# Patient Record
Sex: FEMALE | Race: WHITE | NOT HISPANIC OR LATINO
[De-identification: names, ages, dates, MRNs, and addresses within clinical notes are randomized per-mention and may not be internally consistent; named-entity substitution may affect disease eponyms.]

---

## 2018-03-14 ENCOUNTER — APPOINTMENT (OUTPATIENT)
Dept: ELECTROPHYSIOLOGY | Facility: CLINIC | Age: 83
End: 2018-03-14
Payer: MEDICARE

## 2018-03-14 ENCOUNTER — APPOINTMENT (OUTPATIENT)
Dept: MRI IMAGING | Facility: HOSPITAL | Age: 83
End: 2018-03-14
Payer: MEDICARE

## 2018-03-14 ENCOUNTER — OUTPATIENT (OUTPATIENT)
Dept: OUTPATIENT SERVICES | Facility: HOSPITAL | Age: 83
LOS: 1 days | End: 2018-03-14
Payer: MEDICARE

## 2018-03-14 DIAGNOSIS — R51 HEADACHE: ICD-10-CM

## 2018-03-14 DIAGNOSIS — G45.9 TRANSIENT CEREBRAL ISCHEMIC ATTACK, UNSPECIFIED: ICD-10-CM

## 2018-03-14 PROCEDURE — 70546 MR ANGIOGRAPH HEAD W/O&W/DYE: CPT

## 2018-03-14 PROCEDURE — 71046 X-RAY EXAM CHEST 2 VIEWS: CPT

## 2018-03-14 PROCEDURE — 70544 MR ANGIOGRAPHY HEAD W/O DYE: CPT

## 2018-03-14 PROCEDURE — 93280 PM DEVICE PROGR EVAL DUAL: CPT

## 2018-03-14 PROCEDURE — 70553 MRI BRAIN STEM W/O & W/DYE: CPT

## 2018-03-14 PROCEDURE — 70544 MR ANGIOGRAPHY HEAD W/O DYE: CPT | Mod: 26,59

## 2018-03-14 PROCEDURE — 70553 MRI BRAIN STEM W/O & W/DYE: CPT | Mod: 26

## 2018-03-14 PROCEDURE — ZZZZZ: CPT

## 2018-03-14 PROCEDURE — A9585: CPT

## 2018-03-14 PROCEDURE — 71046 X-RAY EXAM CHEST 2 VIEWS: CPT | Mod: 26

## 2018-04-18 ENCOUNTER — APPOINTMENT (OUTPATIENT)
Dept: MRI IMAGING | Facility: HOSPITAL | Age: 83
End: 2018-04-18

## 2018-04-18 ENCOUNTER — OUTPATIENT (OUTPATIENT)
Dept: OUTPATIENT SERVICES | Facility: HOSPITAL | Age: 83
LOS: 1 days | End: 2018-04-18
Payer: MEDICARE

## 2018-04-18 ENCOUNTER — APPOINTMENT (OUTPATIENT)
Dept: ELECTROPHYSIOLOGY | Facility: CLINIC | Age: 83
End: 2018-04-18
Payer: MEDICARE

## 2018-04-18 VITALS
HEART RATE: 87 BPM | DIASTOLIC BLOOD PRESSURE: 68 MMHG | BODY MASS INDEX: 24.99 KG/M2 | HEIGHT: 65 IN | OXYGEN SATURATION: 96 % | SYSTOLIC BLOOD PRESSURE: 105 MMHG | WEIGHT: 150 LBS

## 2018-04-18 DIAGNOSIS — G96.0 CEREBROSPINAL FLUID LEAK: ICD-10-CM

## 2018-04-18 PROCEDURE — 72141 MRI NECK SPINE W/O DYE: CPT | Mod: 26

## 2018-04-18 PROCEDURE — 71046 X-RAY EXAM CHEST 2 VIEWS: CPT

## 2018-04-18 PROCEDURE — 93280 PM DEVICE PROGR EVAL DUAL: CPT

## 2018-04-18 PROCEDURE — 72148 MRI LUMBAR SPINE W/O DYE: CPT

## 2018-04-18 PROCEDURE — 72141 MRI NECK SPINE W/O DYE: CPT

## 2018-04-18 PROCEDURE — 71046 X-RAY EXAM CHEST 2 VIEWS: CPT | Mod: 26

## 2018-04-18 PROCEDURE — 72148 MRI LUMBAR SPINE W/O DYE: CPT | Mod: 26

## 2018-04-18 PROCEDURE — 72146 MRI CHEST SPINE W/O DYE: CPT

## 2018-04-18 PROCEDURE — 93280 PM DEVICE PROGR EVAL DUAL: CPT | Mod: 77

## 2018-04-18 PROCEDURE — 72146 MRI CHEST SPINE W/O DYE: CPT | Mod: 26

## 2018-04-24 ENCOUNTER — TRANSCRIPTION ENCOUNTER (OUTPATIENT)
Age: 83
End: 2018-04-24

## 2018-04-25 ENCOUNTER — APPOINTMENT (OUTPATIENT)
Dept: ELECTROPHYSIOLOGY | Facility: CLINIC | Age: 83
End: 2018-04-25

## 2018-04-25 ENCOUNTER — APPOINTMENT (OUTPATIENT)
Dept: MRI IMAGING | Facility: HOSPITAL | Age: 83
End: 2018-04-25

## 2018-06-15 ENCOUNTER — TRANSCRIPTION ENCOUNTER (OUTPATIENT)
Age: 83
End: 2018-06-15

## 2018-07-27 ENCOUNTER — TRANSCRIPTION ENCOUNTER (OUTPATIENT)
Age: 83
End: 2018-07-27

## 2018-08-07 ENCOUNTER — TRANSCRIPTION ENCOUNTER (OUTPATIENT)
Age: 83
End: 2018-08-07

## 2018-08-07 NOTE — ASU PATIENT PROFILE, ADULT - PMH
Atrial fibrillation    Depression    Hearing impairment    HLD (hyperlipidemia)    Hypothyroid    Intracranial hypotension    Osteoporosis    Takotsubo cardiomyopathy

## 2018-08-08 ENCOUNTER — OUTPATIENT (OUTPATIENT)
Dept: OUTPATIENT SERVICES | Facility: HOSPITAL | Age: 83
LOS: 1 days | End: 2018-08-08
Payer: MEDICARE

## 2018-08-08 VITALS
HEIGHT: 66 IN | HEART RATE: 61 BPM | WEIGHT: 150.36 LBS | TEMPERATURE: 99 F | RESPIRATION RATE: 14 BRPM | SYSTOLIC BLOOD PRESSURE: 126 MMHG | OXYGEN SATURATION: 96 % | DIASTOLIC BLOOD PRESSURE: 69 MMHG

## 2018-08-08 VITALS
DIASTOLIC BLOOD PRESSURE: 72 MMHG | HEART RATE: 62 BPM | OXYGEN SATURATION: 98 % | RESPIRATION RATE: 18 BRPM | SYSTOLIC BLOOD PRESSURE: 113 MMHG

## 2018-08-08 DIAGNOSIS — Z90.89 ACQUIRED ABSENCE OF OTHER ORGANS: Chronic | ICD-10-CM

## 2018-08-08 DIAGNOSIS — Z41.9 ENCOUNTER FOR PROCEDURE FOR PURPOSES OTHER THAN REMEDYING HEALTH STATE, UNSPECIFIED: Chronic | ICD-10-CM

## 2018-08-08 DIAGNOSIS — Z95.0 PRESENCE OF CARDIAC PACEMAKER: Chronic | ICD-10-CM

## 2018-08-08 DIAGNOSIS — H25.12 AGE-RELATED NUCLEAR CATARACT, LEFT EYE: ICD-10-CM

## 2018-08-08 DIAGNOSIS — Z90.49 ACQUIRED ABSENCE OF OTHER SPECIFIED PARTS OF DIGESTIVE TRACT: Chronic | ICD-10-CM

## 2018-08-08 PROCEDURE — V2787: CPT

## 2018-08-08 PROCEDURE — 66984 XCAPSL CTRC RMVL W/O ECP: CPT | Mod: LT

## 2018-08-08 NOTE — ASU DISCHARGE PLAN (ADULT/PEDIATRIC). - PT EDUC
Implant card (specify)/other (specify)/Intraocular lens implant (IOL), Eye shield with instructions , sunglasses and eye kit given to patient.

## 2018-08-11 PROBLEM — I48.91 UNSPECIFIED ATRIAL FIBRILLATION: Chronic | Status: ACTIVE | Noted: 2018-08-08

## 2018-08-11 PROBLEM — E03.9 HYPOTHYROIDISM, UNSPECIFIED: Chronic | Status: ACTIVE | Noted: 2018-08-08

## 2018-08-11 PROBLEM — G93.89 OTHER SPECIFIED DISORDERS OF BRAIN: Chronic | Status: ACTIVE | Noted: 2018-08-08

## 2018-08-11 PROBLEM — H91.90 UNSPECIFIED HEARING LOSS, UNSPECIFIED EAR: Chronic | Status: ACTIVE | Noted: 2018-08-08

## 2018-08-11 PROBLEM — F32.9 MAJOR DEPRESSIVE DISORDER, SINGLE EPISODE, UNSPECIFIED: Chronic | Status: ACTIVE | Noted: 2018-08-08

## 2018-08-11 PROBLEM — M81.0 AGE-RELATED OSTEOPOROSIS WITHOUT CURRENT PATHOLOGICAL FRACTURE: Chronic | Status: ACTIVE | Noted: 2018-08-08

## 2018-08-11 PROBLEM — I51.81 TAKOTSUBO SYNDROME: Chronic | Status: ACTIVE | Noted: 2018-08-08

## 2018-08-11 PROBLEM — E78.5 HYPERLIPIDEMIA, UNSPECIFIED: Chronic | Status: ACTIVE | Noted: 2018-08-08

## 2018-08-21 ENCOUNTER — TRANSCRIPTION ENCOUNTER (OUTPATIENT)
Age: 83
End: 2018-08-21

## 2018-08-22 ENCOUNTER — OUTPATIENT (OUTPATIENT)
Dept: OUTPATIENT SERVICES | Facility: HOSPITAL | Age: 83
LOS: 1 days | End: 2018-08-22
Payer: MEDICARE

## 2018-08-22 VITALS
SYSTOLIC BLOOD PRESSURE: 131 MMHG | HEART RATE: 62 BPM | OXYGEN SATURATION: 100 % | WEIGHT: 146.83 LBS | HEIGHT: 65 IN | DIASTOLIC BLOOD PRESSURE: 71 MMHG | TEMPERATURE: 98 F | RESPIRATION RATE: 15 BRPM

## 2018-08-22 VITALS
RESPIRATION RATE: 19 BRPM | DIASTOLIC BLOOD PRESSURE: 72 MMHG | OXYGEN SATURATION: 99 % | SYSTOLIC BLOOD PRESSURE: 148 MMHG | HEART RATE: 60 BPM

## 2018-08-22 DIAGNOSIS — Z90.49 ACQUIRED ABSENCE OF OTHER SPECIFIED PARTS OF DIGESTIVE TRACT: Chronic | ICD-10-CM

## 2018-08-22 DIAGNOSIS — Z95.0 PRESENCE OF CARDIAC PACEMAKER: Chronic | ICD-10-CM

## 2018-08-22 DIAGNOSIS — Z41.9 ENCOUNTER FOR PROCEDURE FOR PURPOSES OTHER THAN REMEDYING HEALTH STATE, UNSPECIFIED: Chronic | ICD-10-CM

## 2018-08-22 DIAGNOSIS — H25.11 AGE-RELATED NUCLEAR CATARACT, RIGHT EYE: ICD-10-CM

## 2018-08-22 DIAGNOSIS — Z90.89 ACQUIRED ABSENCE OF OTHER ORGANS: Chronic | ICD-10-CM

## 2018-08-22 PROCEDURE — 66984 XCAPSL CTRC RMVL W/O ECP: CPT | Mod: RT

## 2018-08-22 PROCEDURE — V2787: CPT

## 2018-08-29 ENCOUNTER — APPOINTMENT (OUTPATIENT)
Dept: ELECTROPHYSIOLOGY | Facility: CLINIC | Age: 83
End: 2018-08-29
Payer: MEDICARE

## 2018-08-29 ENCOUNTER — APPOINTMENT (OUTPATIENT)
Dept: MRI IMAGING | Facility: HOSPITAL | Age: 83
End: 2018-08-29

## 2018-08-29 ENCOUNTER — OUTPATIENT (OUTPATIENT)
Dept: OUTPATIENT SERVICES | Facility: HOSPITAL | Age: 83
LOS: 1 days | End: 2018-08-29
Payer: MEDICARE

## 2018-08-29 DIAGNOSIS — Z90.89 ACQUIRED ABSENCE OF OTHER ORGANS: Chronic | ICD-10-CM

## 2018-08-29 DIAGNOSIS — G93.9 DISORDER OF BRAIN, UNSPECIFIED: ICD-10-CM

## 2018-08-29 DIAGNOSIS — Z90.49 ACQUIRED ABSENCE OF OTHER SPECIFIED PARTS OF DIGESTIVE TRACT: Chronic | ICD-10-CM

## 2018-08-29 DIAGNOSIS — G96.0 CEREBROSPINAL FLUID LEAK: ICD-10-CM

## 2018-08-29 DIAGNOSIS — Z95.0 PRESENCE OF CARDIAC PACEMAKER: Chronic | ICD-10-CM

## 2018-08-29 DIAGNOSIS — Z41.9 ENCOUNTER FOR PROCEDURE FOR PURPOSES OTHER THAN REMEDYING HEALTH STATE, UNSPECIFIED: Chronic | ICD-10-CM

## 2018-08-29 PROCEDURE — 71046 X-RAY EXAM CHEST 2 VIEWS: CPT | Mod: 26

## 2018-08-29 PROCEDURE — 70553 MRI BRAIN STEM W/O & W/DYE: CPT | Mod: 26

## 2018-08-29 PROCEDURE — 93280 PM DEVICE PROGR EVAL DUAL: CPT

## 2018-08-29 PROCEDURE — 93280 PM DEVICE PROGR EVAL DUAL: CPT | Mod: 76

## 2018-08-29 PROCEDURE — 71046 X-RAY EXAM CHEST 2 VIEWS: CPT

## 2018-08-29 PROCEDURE — 70553 MRI BRAIN STEM W/O & W/DYE: CPT

## 2018-09-18 ENCOUNTER — OUTPATIENT (OUTPATIENT)
Dept: OUTPATIENT SERVICES | Facility: HOSPITAL | Age: 83
LOS: 1 days | End: 2018-09-18
Payer: MEDICARE

## 2018-09-18 ENCOUNTER — APPOINTMENT (OUTPATIENT)
Dept: ULTRASOUND IMAGING | Facility: HOSPITAL | Age: 83
End: 2018-09-18
Payer: MEDICARE

## 2018-09-18 DIAGNOSIS — Z90.49 ACQUIRED ABSENCE OF OTHER SPECIFIED PARTS OF DIGESTIVE TRACT: Chronic | ICD-10-CM

## 2018-09-18 DIAGNOSIS — Z41.9 ENCOUNTER FOR PROCEDURE FOR PURPOSES OTHER THAN REMEDYING HEALTH STATE, UNSPECIFIED: Chronic | ICD-10-CM

## 2018-09-18 DIAGNOSIS — Z90.89 ACQUIRED ABSENCE OF OTHER ORGANS: Chronic | ICD-10-CM

## 2018-09-18 DIAGNOSIS — N39.0 URINARY TRACT INFECTION, SITE NOT SPECIFIED: ICD-10-CM

## 2018-09-18 DIAGNOSIS — Z95.0 PRESENCE OF CARDIAC PACEMAKER: Chronic | ICD-10-CM

## 2018-09-18 PROCEDURE — 76770 US EXAM ABDO BACK WALL COMP: CPT

## 2018-09-18 PROCEDURE — 76770 US EXAM ABDO BACK WALL COMP: CPT | Mod: 26

## 2018-12-20 ENCOUNTER — TRANSCRIPTION ENCOUNTER (OUTPATIENT)
Age: 83
End: 2018-12-20

## 2019-01-11 ENCOUNTER — TRANSCRIPTION ENCOUNTER (OUTPATIENT)
Age: 84
End: 2019-01-11

## 2019-02-14 ENCOUNTER — TRANSCRIPTION ENCOUNTER (OUTPATIENT)
Age: 84
End: 2019-02-14

## 2019-06-01 ENCOUNTER — TRANSCRIPTION ENCOUNTER (OUTPATIENT)
Age: 84
End: 2019-06-01

## 2019-06-23 ENCOUNTER — TRANSCRIPTION ENCOUNTER (OUTPATIENT)
Age: 84
End: 2019-06-23

## 2019-08-15 ENCOUNTER — TRANSCRIPTION ENCOUNTER (OUTPATIENT)
Age: 84
End: 2019-08-15

## 2019-08-28 ENCOUNTER — TRANSCRIPTION ENCOUNTER (OUTPATIENT)
Age: 84
End: 2019-08-28

## 2019-09-07 ENCOUNTER — INPATIENT (INPATIENT)
Facility: HOSPITAL | Age: 84
LOS: 5 days | Discharge: NURSING, REHAB OR LONG-TERM CARE | DRG: 552 | End: 2019-09-13
Attending: SURGERY | Admitting: SURGERY
Payer: MEDICARE

## 2019-09-07 VITALS
HEART RATE: 96 BPM | RESPIRATION RATE: 16 BRPM | WEIGHT: 154.98 LBS | TEMPERATURE: 98 F | HEIGHT: 65 IN | DIASTOLIC BLOOD PRESSURE: 62 MMHG | SYSTOLIC BLOOD PRESSURE: 132 MMHG | OXYGEN SATURATION: 98 %

## 2019-09-07 DIAGNOSIS — Z90.49 ACQUIRED ABSENCE OF OTHER SPECIFIED PARTS OF DIGESTIVE TRACT: Chronic | ICD-10-CM

## 2019-09-07 DIAGNOSIS — Z41.9 ENCOUNTER FOR PROCEDURE FOR PURPOSES OTHER THAN REMEDYING HEALTH STATE, UNSPECIFIED: Chronic | ICD-10-CM

## 2019-09-07 DIAGNOSIS — Z90.89 ACQUIRED ABSENCE OF OTHER ORGANS: Chronic | ICD-10-CM

## 2019-09-07 DIAGNOSIS — S32.9XXA FRACTURE OF UNSPECIFIED PARTS OF LUMBOSACRAL SPINE AND PELVIS, INITIAL ENCOUNTER FOR CLOSED FRACTURE: ICD-10-CM

## 2019-09-07 DIAGNOSIS — Z95.0 PRESENCE OF CARDIAC PACEMAKER: Chronic | ICD-10-CM

## 2019-09-07 LAB
ALBUMIN SERPL ELPH-MCNC: 4.3 G/DL — SIGNIFICANT CHANGE UP (ref 3.3–5)
ALP SERPL-CCNC: 61 U/L — SIGNIFICANT CHANGE UP (ref 40–120)
ALT FLD-CCNC: 33 U/L — SIGNIFICANT CHANGE UP (ref 10–45)
ANION GAP SERPL CALC-SCNC: 14 MMOL/L — SIGNIFICANT CHANGE UP (ref 5–17)
APTT BLD: 29.6 SEC — SIGNIFICANT CHANGE UP (ref 27.5–36.3)
AST SERPL-CCNC: 28 U/L — SIGNIFICANT CHANGE UP (ref 10–40)
BASOPHILS # BLD AUTO: 0 K/UL — SIGNIFICANT CHANGE UP (ref 0–0.2)
BASOPHILS NFR BLD AUTO: 0.2 % — SIGNIFICANT CHANGE UP (ref 0–2)
BILIRUB SERPL-MCNC: 0.3 MG/DL — SIGNIFICANT CHANGE UP (ref 0.2–1.2)
BUN SERPL-MCNC: 17 MG/DL — SIGNIFICANT CHANGE UP (ref 7–23)
CALCIUM SERPL-MCNC: 9.4 MG/DL — SIGNIFICANT CHANGE UP (ref 8.4–10.5)
CHLORIDE SERPL-SCNC: 100 MMOL/L — SIGNIFICANT CHANGE UP (ref 96–108)
CO2 SERPL-SCNC: 26 MMOL/L — SIGNIFICANT CHANGE UP (ref 22–31)
CREAT SERPL-MCNC: 1.34 MG/DL — HIGH (ref 0.5–1.3)
EOSINOPHIL # BLD AUTO: 0.2 K/UL — SIGNIFICANT CHANGE UP (ref 0–0.5)
EOSINOPHIL NFR BLD AUTO: 3.2 % — SIGNIFICANT CHANGE UP (ref 0–6)
GLUCOSE SERPL-MCNC: 125 MG/DL — HIGH (ref 70–99)
HCT VFR BLD CALC: 42.1 % — SIGNIFICANT CHANGE UP (ref 34.5–45)
HGB BLD-MCNC: 13.1 G/DL — SIGNIFICANT CHANGE UP (ref 11.5–15.5)
INR BLD: 3.33 RATIO — HIGH (ref 0.88–1.16)
LYMPHOCYTES # BLD AUTO: 0.4 K/UL — LOW (ref 1–3.3)
LYMPHOCYTES # BLD AUTO: 6.1 % — LOW (ref 13–44)
MCHC RBC-ENTMCNC: 27.8 PG — SIGNIFICANT CHANGE UP (ref 27–34)
MCHC RBC-ENTMCNC: 31.2 GM/DL — LOW (ref 32–36)
MCV RBC AUTO: 89.2 FL — SIGNIFICANT CHANGE UP (ref 80–100)
MONOCYTES # BLD AUTO: 0.5 K/UL — SIGNIFICANT CHANGE UP (ref 0–0.9)
MONOCYTES NFR BLD AUTO: 7.1 % — SIGNIFICANT CHANGE UP (ref 2–14)
NEUTROPHILS # BLD AUTO: 5.9 K/UL — SIGNIFICANT CHANGE UP (ref 1.8–7.4)
NEUTROPHILS NFR BLD AUTO: 83.3 % — HIGH (ref 43–77)
PLATELET # BLD AUTO: 202 K/UL — SIGNIFICANT CHANGE UP (ref 150–400)
POTASSIUM SERPL-MCNC: 3.6 MMOL/L — SIGNIFICANT CHANGE UP (ref 3.5–5.3)
POTASSIUM SERPL-SCNC: 3.6 MMOL/L — SIGNIFICANT CHANGE UP (ref 3.5–5.3)
PROT SERPL-MCNC: 7.2 G/DL — SIGNIFICANT CHANGE UP (ref 6–8.3)
PROTHROM AB SERPL-ACNC: 39.8 SEC — HIGH (ref 10–12.9)
RBC # BLD: 4.72 M/UL — SIGNIFICANT CHANGE UP (ref 3.8–5.2)
RBC # FLD: 14 % — SIGNIFICANT CHANGE UP (ref 10.3–14.5)
SODIUM SERPL-SCNC: 140 MMOL/L — SIGNIFICANT CHANGE UP (ref 135–145)
WBC # BLD: 7.1 K/UL — SIGNIFICANT CHANGE UP (ref 3.8–10.5)
WBC # FLD AUTO: 7.1 K/UL — SIGNIFICANT CHANGE UP (ref 3.8–10.5)

## 2019-09-07 PROCEDURE — 99221 1ST HOSP IP/OBS SF/LOW 40: CPT

## 2019-09-07 PROCEDURE — 93010 ELECTROCARDIOGRAM REPORT: CPT

## 2019-09-07 PROCEDURE — 72192 CT PELVIS W/O DYE: CPT | Mod: 26

## 2019-09-07 PROCEDURE — 99291 CRITICAL CARE FIRST HOUR: CPT

## 2019-09-07 PROCEDURE — 71275 CT ANGIOGRAPHY CHEST: CPT | Mod: 26

## 2019-09-07 PROCEDURE — 99285 EMERGENCY DEPT VISIT HI MDM: CPT | Mod: GC

## 2019-09-07 PROCEDURE — 70450 CT HEAD/BRAIN W/O DYE: CPT | Mod: 26

## 2019-09-07 PROCEDURE — 73502 X-RAY EXAM HIP UNI 2-3 VIEWS: CPT | Mod: 26,RT

## 2019-09-07 PROCEDURE — 74174 CTA ABD&PLVS W/CONTRAST: CPT | Mod: 26

## 2019-09-07 RX ORDER — OXYCODONE HYDROCHLORIDE 5 MG/1
5 TABLET ORAL EVERY 6 HOURS
Refills: 0 | Status: DISCONTINUED | OUTPATIENT
Start: 2019-09-07 | End: 2019-09-10

## 2019-09-07 RX ORDER — DENOSUMAB 60 MG/ML
0 INJECTION SUBCUTANEOUS
Qty: 0 | Refills: 0 | DISCHARGE

## 2019-09-07 RX ORDER — LIDOCAINE 4 G/100G
1 CREAM TOPICAL EVERY 24 HOURS
Refills: 0 | Status: DISCONTINUED | OUTPATIENT
Start: 2019-09-07 | End: 2019-09-13

## 2019-09-07 RX ORDER — RANITIDINE HYDROCHLORIDE 150 MG/1
1 TABLET, FILM COATED ORAL
Qty: 0 | Refills: 0 | DISCHARGE

## 2019-09-07 RX ORDER — ASPIRIN/CALCIUM CARB/MAGNESIUM 324 MG
324 TABLET ORAL ONCE
Refills: 0 | Status: DISCONTINUED | OUTPATIENT
Start: 2019-09-07 | End: 2019-09-07

## 2019-09-07 RX ORDER — WARFARIN SODIUM 2.5 MG/1
1 TABLET ORAL
Qty: 0 | Refills: 0 | DISCHARGE

## 2019-09-07 RX ORDER — INFLUENZA VIRUS VACCINE 15; 15; 15; 15 UG/.5ML; UG/.5ML; UG/.5ML; UG/.5ML
0.5 SUSPENSION INTRAMUSCULAR ONCE
Refills: 0 | Status: COMPLETED | OUTPATIENT
Start: 2019-09-07 | End: 2019-09-07

## 2019-09-07 RX ORDER — METOPROLOL TARTRATE 50 MG
25 TABLET ORAL DAILY
Refills: 0 | Status: DISCONTINUED | OUTPATIENT
Start: 2019-09-07 | End: 2019-09-08

## 2019-09-07 RX ORDER — OLANZAPINE 15 MG/1
5 TABLET, FILM COATED ORAL
Refills: 0 | Status: DISCONTINUED | OUTPATIENT
Start: 2019-09-07 | End: 2019-09-13

## 2019-09-07 RX ORDER — ACETAMINOPHEN 500 MG
975 TABLET ORAL ONCE
Refills: 0 | Status: COMPLETED | OUTPATIENT
Start: 2019-09-07 | End: 2019-09-07

## 2019-09-07 RX ORDER — PANTOPRAZOLE SODIUM 20 MG/1
1 TABLET, DELAYED RELEASE ORAL
Qty: 0 | Refills: 0 | DISCHARGE

## 2019-09-07 RX ORDER — SERTRALINE 25 MG/1
200 TABLET, FILM COATED ORAL DAILY
Refills: 0 | Status: DISCONTINUED | OUTPATIENT
Start: 2019-09-07 | End: 2019-09-09

## 2019-09-07 RX ORDER — OLANZAPINE 15 MG/1
0 TABLET, FILM COATED ORAL
Qty: 0 | Refills: 0 | DISCHARGE

## 2019-09-07 RX ORDER — ACETAMINOPHEN 500 MG
650 TABLET ORAL EVERY 6 HOURS
Refills: 0 | Status: DISCONTINUED | OUTPATIENT
Start: 2019-09-07 | End: 2019-09-08

## 2019-09-07 RX ORDER — LEVOTHYROXINE SODIUM 125 MCG
75 TABLET ORAL DAILY
Refills: 0 | Status: DISCONTINUED | OUTPATIENT
Start: 2019-09-07 | End: 2019-09-13

## 2019-09-07 RX ORDER — SERTRALINE 25 MG/1
2 TABLET, FILM COATED ORAL
Qty: 0 | Refills: 0 | DISCHARGE

## 2019-09-07 RX ORDER — OXYCODONE HYDROCHLORIDE 5 MG/1
5 TABLET ORAL ONCE
Refills: 0 | Status: DISCONTINUED | OUTPATIENT
Start: 2019-09-07 | End: 2019-09-07

## 2019-09-07 RX ORDER — PANTOPRAZOLE SODIUM 20 MG/1
40 TABLET, DELAYED RELEASE ORAL
Refills: 0 | Status: DISCONTINUED | OUTPATIENT
Start: 2019-09-07 | End: 2019-09-13

## 2019-09-07 RX ORDER — SODIUM CHLORIDE 9 MG/ML
1000 INJECTION INTRAMUSCULAR; INTRAVENOUS; SUBCUTANEOUS ONCE
Refills: 0 | Status: COMPLETED | OUTPATIENT
Start: 2019-09-07 | End: 2019-09-07

## 2019-09-07 RX ORDER — LEVOTHYROXINE SODIUM 125 MCG
1 TABLET ORAL
Qty: 0 | Refills: 0 | DISCHARGE

## 2019-09-07 RX ADMIN — SODIUM CHLORIDE 2000 MILLILITER(S): 9 INJECTION INTRAMUSCULAR; INTRAVENOUS; SUBCUTANEOUS at 08:04

## 2019-09-07 RX ADMIN — OXYCODONE HYDROCHLORIDE 5 MILLIGRAM(S): 5 TABLET ORAL at 15:00

## 2019-09-07 RX ADMIN — Medication 650 MILLIGRAM(S): at 19:00

## 2019-09-07 RX ADMIN — OXYCODONE HYDROCHLORIDE 5 MILLIGRAM(S): 5 TABLET ORAL at 10:28

## 2019-09-07 RX ADMIN — Medication 975 MILLIGRAM(S): at 07:40

## 2019-09-07 RX ADMIN — PANTOPRAZOLE SODIUM 40 MILLIGRAM(S): 20 TABLET, DELAYED RELEASE ORAL at 18:38

## 2019-09-07 RX ADMIN — Medication 75 MICROGRAM(S): at 18:39

## 2019-09-07 RX ADMIN — Medication 650 MILLIGRAM(S): at 18:38

## 2019-09-07 RX ADMIN — LIDOCAINE 1 PATCH: 4 CREAM TOPICAL at 18:39

## 2019-09-07 RX ADMIN — Medication 25 MILLIGRAM(S): at 18:39

## 2019-09-07 NOTE — H&P ADULT - NSICDXPASTSURGICALHX_GEN_ALL_CORE_FT
PAST SURGICAL HISTORY:  Cardiac pacemaker     Elective surgery lymph node surgery    S/P appendectomy     S/P tonsillectomy

## 2019-09-07 NOTE — H&P ADULT - ASSESSMENT
ASSESSMENT: This is an 84 year old female, on coumadin for atrial fibrillation, s/p fall, with pelvic fracture, right sided posterior 12th rib fx    PLAN:      - Admit to trauma surgery (Dr. Ryan Cabrera)   - regular diet, restart home meds, hold anticoagulation, repeat cbc in pm today, repeat cbc in am   - appreciate orthopedic surgery input (WBAT, repeat Pelvic XR's in 1 week)  - physical therapy consult   - Patient seen/examined with attending.   - Plan discussed with Attending, Dr. Cabrera ASSESSMENT: This is an 84 year old female, on coumadin for atrial fibrillation, s/p fall, with pelvic fracture, right sided posterior 12th rib fx, pelvic hematoma with no active extravasation.     PLAN:      - Admit to trauma surgery (Dr. Ryan Cabrera)   - regular diet, restart home meds, hold anticoagulation, repeat cbc in pm today, repeat cbc in am   - appreciate orthopedic surgery input (WBAT, repeat Pelvic XR's in 1 week)  - physical therapy consult   - Patient seen/examined with attending.   - Plan discussed with Attending, Dr. Cabrera

## 2019-09-07 NOTE — H&P ADULT - NSICDXPASTMEDICALHX_GEN_ALL_CORE_FT
PAST MEDICAL HISTORY:  Atrial fibrillation     Depression     Hearing impairment     HLD (hyperlipidemia)     Hypothyroid     Intracranial hypotension     Osteoporosis     Takotsubo cardiomyopathy

## 2019-09-07 NOTE — ED ADULT NURSE NOTE - OBJECTIVE STATEMENT
83 y/o Female presenting to the ED by EMS from home, after getting up to go to the bathroom and having a syncopal episode on the way to the bathroom. Pt reports constipation recently so she took a laxative and has had multiple episodes of diarrhea. +LOC, pt hit her head, reports headache but denies blurry vision or dizziness at this time, pt on coumadin. Pupils 3mm PERRL. Normal ROM noted. Pt appears slightly pale at this time. Pt resting quietly on stretcher in no current distress. Pt denies chest pain, shortness of breath, fever, chills, weakness, N/V. Safety and comfort measures provided and maintained, bed in lowest position, side rails up for safety.

## 2019-09-07 NOTE — ED PROVIDER NOTE - ATTENDING CONTRIBUTION TO CARE
I have seen and evaluated this patient with the resident.   I agree with the findings  unless other wise stated.  I have made appropriate changes in documentations where needed, After my face to face bedside evaluation, I am further  notin yo F PMHx pacemaker, AFIB on Coumadin, hypothyroidism, being treated for UTI with Clindamycin, p/w fall. Pt fell this morning around 6 AM when she woke up to use the bathroom. She has been having multiple episodes of diarrhea since last night. She denies LOC. She states she felt dizzy and fell but recalls the whole event. She denies CP/palpitations, headaches, vision changes, abd pain, N/V. She has not been able to ambulate since the fall. She denies tobacco, ETOH, drug use. Alert mild distress 2/2/ pain clear lungs heart irregular Abdomen soft no tender tender in right groin no limb shortening right side no hip tenderness no distal neuro vascular deficit concern for pelvic fracture and bleeding as pt also on coumadin pain control CT pelvis --Porter I have seen and evaluated this patient with the resident.   I agree with the findings  unless other wise stated.  I have made appropriate changes in documentations where needed, After my face to face bedside evaluation, I am further  notin yo F PMHx pacemaker, AFIB on Coumadin, hypothyroidism, being treated for UTI with Clindamycin, p/w fall. Pt fell this morning around 6 AM when she woke up to use the bathroom. She has been having multiple episodes of diarrhea since last night. She denies LOC. She states she felt dizzy and fell but recalls the whole event. She denies CP/palpitations, headaches, vision changes, abd pain, N/V. She has not been able to ambulate since the fall. She denies tobacco, ETOH, drug use. Alert mild distress 2/2/ pain clear lungs heart irregular Abdomen soft no tender tender in right groin no limb shortening right side no hip tenderness no distal neuro vascular deficit concern for pelvic fracture and bleeding as pt also on coumadin pain control CT pelvis Trauma and Ortho eval -CTA chest abdomen and pelvis to determine bleeding TBA -Porter

## 2019-09-07 NOTE — ED PROVIDER NOTE - NS ED ROS FT
GENERAL: No fever or chills, EYES: no change in vision, HEENT: no trouble swallowing or speaking, CARDIAC: no chest pain, PULMONARY: no cough or SOB, GI: no abdominal pain, no nausea, no vomiting, +diarrhea, no constipation, : No changes in urination, SKIN: no rashes, NEURO: no headache,  MSK: No joint pain ~Suman Manjarrez M.D. Resident

## 2019-09-07 NOTE — H&P ADULT - NSHPLABSRESULTS_GEN_ALL_CORE
CT abd/pelvis: CT of the Chest, Abdomen and Pelvis was performed with intravenous   contrast.   Imaging was performed through the chest in the arterial phase followed by   imaging of the abdomen and pelvis in the portal venous phase.  Intravenous contrast: 90 ml Omnipaque 350. 10 ml discarded.  Oral contrast: None.  Sagittal and coronal reformats were performed.    FINDINGS:    CHEST:     LUNGS AND LARGE AIRWAYS: The airways are unremarkable. The lungs are   clear. Linear atelectasis within the left lower lobe.  PLEURA: No pleural effusion. No pneumothorax.  VESSELS: Atheromatous disease.  HEART: Cardiomegaly. No pericardial effusion. Status post left-sided   pacemaker placement.  MEDIASTINUM AND MACY: No lymphadenopathy. Small hiatal hernia.  CHEST WALL AND LOWER NECK: Left sided chest wall pacemaker placement.    ABDOMEN AND PELVIS:    LIVER: Within normal limits.  BILE DUCTS: Mild dilatation of the extrahepatic common bile duct up to   1.1 cm. No intrahepatic ductal dilatation seen.  GALLBLADDER: Cholelithiasis.  SPLEEN: Within normal limits.  PANCREAS: Prominent pancreatic duct up to 3 mm. This appears slightly   increased compared to prior CT from 11/11/2014.  ADRENALS: Within normal limits.  KIDNEYS/URETERS: On the right side, there is a 2 mm nonobstructing renal   calculus within the upper pole. Right hilar pelvic cysts. No   hydronephrosis. On the left side, there is a punctate nonobstructing   renal calculus within the left renal pelvis. No hydronephrosis.    BLADDER: Within normal limits.  REPRODUCTIVE ORGANS: The uterus is unremarkable. No adnexal masses.    BOWEL: No bowel obstruction. Appendix is not visualized.  PERITONEUM: No ascites.  VESSELS: Atheromatous disease  RETROPERITONEUM/LYMPH NODES: No lymphadenopathy.    ABDOMINAL WALL: Within normal limits.  BONES: Minimally displaced acute fracture of the posterior aspect of the   right 12th rib. Acute fracture of the right sacral ala as well as   obliquely oriented minimally displaced fractures of the right superior   and inferior pubic rami. Age indeterminate compression deformity of the   L1 vertebral body. Degenerative changes of the spine.    IMPRESSION:     Minimally displaced acute fracture of the posterior aspect of the right   12th rib. Acute fracture of the right sacral ala as well as obliquely   oriented minimally displaced fractures of the right superior and inferior   pubic rami. Age indeterminate compression deformity of the L1 vertebral   body.          CT head: FINDINGS:   There is no CT evidence of acute intracranial hemorrhage, extra-axial   collection, vasogenic edema, mass effect, midline shift, central   herniation, or hydrocephalus.     There is age-appropriate cerebral volume loss. There is periventricular   patchy white matter hypoattenuation which is nonspecific in etiology but   likely related to chronic microvascular ischemic disease.    The visualized paranasal sinuses are clear. The mastoid air cells and   middle ear cavities are clear.    The soft tissues of the scalp are unremarkable. The calvarium is intact.    IMPRESSION:   No CT evidence of acute intracranial hemorrhage, brain edema, mass effect   or acute territorial infarct.           CT pelvis:    IMPRESSION:  1.  Acute fractures of the right superior and inferior pubic rami.  2.  Acute minimally displaced fracture of the right sacral ala.  3.  Small hematomas are identified adjacent to the right sacral ala and   right superior pubic ramus fractures.

## 2019-09-07 NOTE — ED ADULT NURSE REASSESSMENT NOTE - NS ED NURSE REASSESS COMMENT FT1
Received report from Felicitas BALDWIN. Patient resting comfortably in stretcher. A&Ox4. Vital signs are stable. Patient currently reports 7/10 left hip pain. Patient pending X-ray and blood tests results. Plan of care discussed. Safety and comfort measures maintained.

## 2019-09-07 NOTE — CONSULT NOTE ADULT - SUBJECTIVE AND OBJECTIVE BOX
[facility] Trauma Surgery Consultation       HPI:  This is an 84 year old female, on coumadin for atrial fibrillation, lives in assisted living, ambulate with walker/cane presents after a ground level fall today at 6 am. She has been having diarrhea as she was started on antibiotics for her UTI. She did not lose consciousness. She states she felt dizzy and fell but recalls the whole event. She was trying to use the bathroom when she fell.She did not hit her head. She c/o R hip pain and inability to ambulate after the mechanical fall this morning.  She has Medtronic pacemaker. Denies HS/LOC. Denies numbness/tingling. Denies fever/chills. Denies pain/injury elsewhere.     PAST MEDICAL & SURGICAL HISTORY:  Hearing impairment  Intracranial hypotension  Depression  HLD (hyperlipidemia)  Takotsubo cardiomyopathy  Atrial fibrillation  Osteoporosis  Hypothyroid  Elective surgery: lymph node surgery  Cardiac pacemaker  S/P appendectomy  S/P tonsillectomy      FAMILY HISTORY:      SOCIAL HISTORY: not smoker, does not drink alcohol     MEDICATIONS:    Home Medications:  synthroid 75 mcg  prolia 60 mg into skin every 6 months   warfarin 7.5 mg daily + 1/2 twice weekly   metoprolol 25 mg as needed   protonix 40 mg   ranitidine 300 mg night   zoloft 100 mgx2 daily   aprazolam 0.25 mg x 2 daily   olanzapine 5 mg bedtime 3x week    MEDICATIONS  (PRN):    Allergies    bacitracin (Unknown)  penicillin (Unknown)    Intolerances        Vital Signs Last 24 Hrs  T(C): 36.8 (07 Sep 2019 06:53), Max: 36.8 (07 Sep 2019 06:53)  T(F): 98.3 (07 Sep 2019 06:53), Max: 98.3 (07 Sep 2019 06:53)  HR: 96 (07 Sep 2019 06:53) (96 - 96)  BP: 132/62 (07 Sep 2019 06:53) (132/62 - 132/62)  BP(mean): --  RR: 16 (07 Sep 2019 06:53) (16 - 16)  SpO2: 98% (07 Sep 2019 06:53) (98% - 98%)  Daily Height in cm: 165.1 (07 Sep 2019 06:53)    Daily                             13.1   7.1   )-----------( 202      ( 07 Sep 2019 07:42 )             42.1     09-07    140  |  100  |  17  ----------------------------<  125<H>  3.6   |  26  |  1.34<H>    Ca    9.4      07 Sep 2019 07:42    TPro  7.2  /  Alb  4.3  /  TBili  0.3  /  DBili  x   /  AST  28  /  ALT  33  /  AlkPhos  61  09-07    PT/INR - ( 07 Sep 2019 07:42 )   PT: 39.8 sec;   INR: 3.33 ratio         PTT - ( 07 Sep 2019 07:42 )  PTT:29.6 sec      Radiographic Findings:       Assessment: [facility] Trauma Surgery Consultation       HPI:  This is an 84 year old female, on coumadin for atrial fibrillation, lives in assisted living, ambulate with walker/cane presents after a ground level fall today at 6 am. She has been having diarrhea as she was started on antibiotics for her UTI. She did not lose consciousness. She states she felt dizzy and fell but recalls the whole event. She was trying to use the bathroom when she fell.She did not hit her head. She c/o R hip pain and inability to ambulate after the mechanical fall this morning.  She has Medtronic pacemaker. Denies HS/LOC. Denies numbness/tingling. Denies fever/chills. Denies pain/injury elsewhere.     ROS: 10-system review is otherwise negative except HPI above.      PAST MEDICAL & SURGICAL HISTORY:  Hearing impairment  Intracranial hypotension  Depression  HLD (hyperlipidemia)  Takotsubo cardiomyopathy  Atrial fibrillation  Osteoporosis  Hypothyroid  Elective surgery: lymph node surgery  Cardiac pacemaker  S/P appendectomy  S/P tonsillectomy      FAMILY HISTORY:      SOCIAL HISTORY: not smoker, does not drink alcohol     MEDICATIONS:    Home Medications:  synthroid 75 mcg  prolia 60 mg into skin every 6 months   warfarin 7.5 mg daily + 1/2 of the 7.5 tablet in addition to the 7.5 twice weekly (Monday and Thursday)   metoprolol 25 mg as needed   protonix 40 mg   ranitidine 300 mg night   zoloft 100 mgx2 daily   aprazolam 0.25 mg x 2 daily   olanzapine 5 mg bedtime 3x week    MEDICATIONS  (PRN):    Allergies    bacitracin (Unknown)  penicillin (Unknown)    Intolerances        Vital Signs Last 24 Hrs  T(C): 36.8 (07 Sep 2019 06:53), Max: 36.8 (07 Sep 2019 06:53)  T(F): 98.3 (07 Sep 2019 06:53), Max: 98.3 (07 Sep 2019 06:53)  HR: 96 (07 Sep 2019 06:53) (96 - 96)  BP: 132/62 (07 Sep 2019 06:53) (132/62 - 132/62)  BP(mean): --  RR: 16 (07 Sep 2019 06:53) (16 - 16)  SpO2: 98% (07 Sep 2019 06:53) (98% - 98%)  Daily Height in cm: 165.1 (07 Sep 2019 06:53)        Primary Survey:    A - airway intact  B - bilateral breath sounds and good chest rise  C - initial BP: 132/62 (09-07-19 @ 06:53)*** , HR: 96 (09-07-19 @ 06:53)*** , palpable pulses in all extremities  D - GCS 15 on arrival  Exposure obtained    Secondary Survey:   General: NAD  HEENT: Normocephalic, atraumatic, EOMI, PEERLA.  Neck: Soft, midline trachea  Chest: No chest wall tenderness, thorax stable, no ecchymosis.   Cardiac: irregularly irregular   Respiratory: Bilateral breath sounds, clear and equal bilaterally.   Abdomen: Soft, non-distended, non-tender, no rebound, no guarding, no ecchymosis.   Pelvis: Stable, non-tender.   Ext: palp radial b/l UE, b/l DP palp in Lower Extrem.   Back: not examined                               13.1   7.1   )-----------( 202      ( 07 Sep 2019 07:42 )             42.1     09-07    140  |  100  |  17  ----------------------------<  125<H>  3.6   |  26  |  1.34<H>    Ca    9.4      07 Sep 2019 07:42    TPro  7.2  /  Alb  4.3  /  TBili  0.3  /  DBili  x   /  AST  28  /  ALT  33  /  AlkPhos  61  09-07    PT/INR - ( 07 Sep 2019 07:42 )   PT: 39.8 sec;   INR: 3.33 ratio         PTT - ( 07 Sep 2019 07:42 )  PTT:29.6 sec      Radiographic Findings:

## 2019-09-07 NOTE — H&P ADULT - HISTORY OF PRESENT ILLNESS
HPI:  This is an 84 year old female, on coumadin for atrial fibrillation, lives in assisted living, ambulate with walker/cane presents after a ground level fall today at 6 am. She has been having diarrhea as she was started on antibiotics for her UTI. She did not lose consciousness. She states she felt dizzy and fell but recalls the whole event. She was trying to use the bathroom when she fell.She did not hit her head. She c/o R hip pain and inability to ambulate after the mechanical fall this morning.  She has Medtronic pacemaker. Denies HS/LOC. Denies numbness/tingling. Denies fever/chills. Denies pain/injury elsewhere.     ROS: 10-system review is otherwise negative except HPI above.      PAST MEDICAL & SURGICAL HISTORY:  Hearing impairment  Intracranial hypotension  Depression  HLD (hyperlipidemia)  Takotsubo cardiomyopathy  Atrial fibrillation  Osteoporosis  Hypothyroid  Elective surgery: lymph node surgery  Cardiac pacemaker  S/P appendectomy  S/P tonsillectomy      FAMILY HISTORY:      SOCIAL HISTORY: not smoker, does not drink alcohol     MEDICATIONS:    Home Medications:  synthroid 75 mcg  prolia 60 mg into skin every 6 months   warfarin 7.5 mg daily + 1/2 of the 7.5 tablet in addition to the 7.5 twice weekly (Monday and Thursday)   metoprolol 25 mg as needed   protonix 40 mg   ranitidine 300 mg night   zoloft 100 mgx2 daily   aprazolam 0.25 mg x 2 daily   olanzapine 5 mg bedtime 3x week    MEDICATIONS  (PRN):    Allergies    bacitracin (Unknown)  penicillin (Unknown)    Intolerances        Vital Signs Last 24 Hrs  T(C): 36.8 (07 Sep 2019 06:53), Max: 36.8 (07 Sep 2019 06:53)  T(F): 98.3 (07 Sep 2019 06:53), Max: 98.3 (07 Sep 2019 06:53)  HR: 96 (07 Sep 2019 06:53) (96 - 96)  BP: 132/62 (07 Sep 2019 06:53) (132/62 - 132/62)  BP(mean): --  RR: 16 (07 Sep 2019 06:53) (16 - 16)  SpO2: 98% (07 Sep 2019 06:53) (98% - 98%)  Daily Height in cm: 165.1 (07 Sep 2019 06:53)        Primary Survey:    A - airway intact  B - bilateral breath sounds and good chest rise  C - initial BP: 132/62 (09-07-19 @ 06:53)*** , HR: 96 (09-07-19 @ 06:53)*** , palpable pulses in all extremities  D - GCS 15 on arrival  Exposure obtained    Secondary Survey:   General: NAD  HEENT: Normocephalic, atraumatic, EOMI, PEERLA.  Neck: Soft, midline trachea  Chest: No chest wall tenderness, thorax stable, no ecchymosis.   Cardiac: irregularly irregular   Respiratory: Bilateral breath sounds, clear and equal bilaterally.   Abdomen: Soft, non-distended, non-tender, no rebound, no guarding, no ecchymosis.   Pelvis: Stable, non-tender.   Ext: palp radial b/l UE, b/l DP palp in Lower Extrem.   Back: not examined                               13.1   7.1   )-----------( 202      ( 07 Sep 2019 07:42 )             42.1     09-07    140  |  100  |  17  ----------------------------<  125<H>  3.6   |  26  |  1.34<H>    Ca    9.4      07 Sep 2019 07:42    TPro  7.2  /  Alb  4.3  /  TBili  0.3  /  DBili  x   /  AST  28  /  ALT  33  /  AlkPhos  61  09-07    PT/INR - ( 07 Sep 2019 07:42 )   PT: 39.8 sec;   INR: 3.33 ratio         PTT - ( 07 Sep 2019 07:42 )  PTT:29.6 sec

## 2019-09-07 NOTE — ED PROVIDER NOTE - OBJECTIVE STATEMENT
85 yo F PMHx pacemaker, AFIB on Coumadin, hypothyroidism, being treated for UTI with Clindamycin, p/w fall. Pt fell this morning around 6 AM when she woke up to use the bathroom. She has been having multiple episodes of diarrhea since last night. She denies LOC. She states she felt dizzy and fell but recalls the whole event. She denies CP/palpitations, headaches, vision changes, abd pain, N/V. She has not been able to ambulate since the fall. She denies tobacco, ETOH, drug use.

## 2019-09-07 NOTE — CONSULT NOTE ADULT - SUBJECTIVE AND OBJECTIVE BOX
84y Female community ambulator with walker/cane presents c/o R hip pain and inability to ambulate sp mechanical fall this morning.  Takes coumadin for afib. Medtronic pacemaker. Denies HS/LOC. Denies numbness/tingling. Denies fever/chills. Denies pain/injury elsewhere.     PAST MEDICAL & SURGICAL HISTORY:  Hearing impairment  Intracranial hypotension  Depression  HLD (hyperlipidemia)  Takotsubo cardiomyopathy  Atrial fibrillation  Osteoporosis  Hypothyroid  Elective surgery: lymph node surgery  Cardiac pacemaker  S/P appendectomy  S/P tonsillectomy    MEDICATIONS  (STANDING):    Allergies    bacitracin (Unknown)  penicillin (Unknown)    Intolerances                              13.1   7.1   )-----------( 202      ( 07 Sep 2019 07:42 )             42.1     07 Sep 2019 07:42    140    |  100    |  17     ----------------------------<  125    3.6     |  26     |  1.34     Ca    9.4        07 Sep 2019 07:42    TPro  7.2    /  Alb  4.3    /  TBili  0.3    /  DBili  x      /  AST  28     /  ALT  33     /  AlkPhos  61     07 Sep 2019 07:42    PT/INR - ( 07 Sep 2019 07:42 )   PT: 39.8 sec;   INR: 3.33 ratio         PTT - ( 07 Sep 2019 07:42 )  PTT:29.6 sec  Vital Signs Last 24 Hrs  T(C): 36.8 (09-07-19 @ 06:53), Max: 36.8 (09-07-19 @ 06:53)  T(F): 98.3 (09-07-19 @ 06:53), Max: 98.3 (09-07-19 @ 06:53)  HR: 96 (09-07-19 @ 06:53) (96 - 96)  BP: 132/62 (09-07-19 @ 06:53) (132/62 - 132/62)  BP(mean): --  RR: 16 (09-07-19 @ 06:53) (16 - 16)  SpO2: 98% (09-07-19 @ 06:53) (98% - 98%)    Imaging: XR/CT demonstrates right sacral ala fracture with superior/inferior ramus fractures    Physical Exam  Gen: NAD  R LE: skin intact, unable to SLR, neg log roll, +ttp over pubic rami/symphysis, no ttp elsewhere, +ehl/fhl/ta/gs function, no calf ttp, dp/pt pulse intact, compartments soft  Secondary survey: benign, nv intact, able to SLR contralateral leg, negative log roll contralateral leg, no bony ttp elsewhere    A/P: 84y Female with right sacral ala and pubic rami fractures  Pain control  WBAT  repeat Pelvic XR's in 1 week  FU labs/imaging  Ca/Vit D  Outpt osteoporosis workup  no orthopedic surgical intervention at this time  Can follow up with Dr. Arredondo after discharge from hospital/rehab in 1 week  Will discuss with attending and advise if plan changes

## 2019-09-07 NOTE — ED ADULT NURSE NOTE - INTERVENTIONS DEFINITIONS
Stretcher in lowest position, wheels locked, appropriate side rails in place/Call bell, personal items and telephone within reach/[place] to call system/Non-slip footwear when patient is off stretcher/Physically safe environment: no spills, clutter or unnecessary equipment

## 2019-09-07 NOTE — ED PROVIDER NOTE - PROGRESS NOTE DETAILS
Suman Manjarrez M.D. Resident: Trauma consult placed, will evaluate pt in ER Suman Manjarrez M.D. Resident: Discussed with ortho, will evaluate pt in ER

## 2019-09-07 NOTE — ED PROVIDER NOTE - PHYSICAL EXAMINATION
Gen: AAOx3, non-toxic  Head: NCAT  HEENT: EOMI, oral mucosa moist, normal conjunctiva  Lung: CTAB, no respiratory distress, no wheezes/rhonchi/rales B/L, speaking in full sentences  CV: RRR, no murmurs, rubs or gallops  Abd: soft, NTND, no guarding, no CVA tenderness  MSK: no visible deformities, patient unable to lift legs against gravity 2/2 pain, +5/5 plantar and dorsiflexion bilaterally  Neuro: No focal sensory or motor deficits  Skin: Warm, well perfused, no rash  Psych: normal affect.   ~Suman Manjarrez M.D. Resident

## 2019-09-08 LAB
ANION GAP SERPL CALC-SCNC: 15 MMOL/L — SIGNIFICANT CHANGE UP (ref 5–17)
APPEARANCE UR: CLEAR — SIGNIFICANT CHANGE UP
APTT BLD: 32.9 SEC — SIGNIFICANT CHANGE UP (ref 27.5–36.3)
APTT BLD: 39.7 SEC — HIGH (ref 27.5–36.3)
BASE EXCESS BLDV CALC-SCNC: -0.6 MMOL/L — SIGNIFICANT CHANGE UP (ref -2–2)
BASOPHILS # BLD AUTO: 0 K/UL — SIGNIFICANT CHANGE UP (ref 0–0.2)
BASOPHILS NFR BLD AUTO: 0 % — SIGNIFICANT CHANGE UP (ref 0–2)
BILIRUB UR-MCNC: NEGATIVE — SIGNIFICANT CHANGE UP
BLD GP AB SCN SERPL QL: NEGATIVE — SIGNIFICANT CHANGE UP
BUN SERPL-MCNC: 27 MG/DL — HIGH (ref 7–23)
CA-I SERPL-SCNC: 1.09 MMOL/L — LOW (ref 1.12–1.3)
CALCIUM SERPL-MCNC: 8.4 MG/DL — SIGNIFICANT CHANGE UP (ref 8.4–10.5)
CHLORIDE BLDV-SCNC: 104 MMOL/L — SIGNIFICANT CHANGE UP (ref 96–108)
CHLORIDE SERPL-SCNC: 100 MMOL/L — SIGNIFICANT CHANGE UP (ref 96–108)
CO2 BLDV-SCNC: 24 MMOL/L — SIGNIFICANT CHANGE UP (ref 22–30)
CO2 SERPL-SCNC: 20 MMOL/L — LOW (ref 22–31)
COLOR SPEC: SIGNIFICANT CHANGE UP
CREAT SERPL-MCNC: 1.4 MG/DL — HIGH (ref 0.5–1.3)
DIFF PNL FLD: NEGATIVE — SIGNIFICANT CHANGE UP
EOSINOPHIL # BLD AUTO: 0.1 K/UL — SIGNIFICANT CHANGE UP (ref 0–0.5)
EOSINOPHIL NFR BLD AUTO: 2.4 % — SIGNIFICANT CHANGE UP (ref 0–6)
GAS PNL BLDV: 134 MMOL/L — LOW (ref 135–145)
GAS PNL BLDV: SIGNIFICANT CHANGE UP
GLUCOSE BLDV-MCNC: 121 MG/DL — HIGH (ref 70–99)
GLUCOSE SERPL-MCNC: 127 MG/DL — HIGH (ref 70–99)
GLUCOSE UR QL: NEGATIVE — SIGNIFICANT CHANGE UP
HCO3 BLDV-SCNC: 23 MMOL/L — SIGNIFICANT CHANGE UP (ref 21–29)
HCT VFR BLD CALC: 29.8 % — LOW (ref 34.5–45)
HCT VFR BLD CALC: 30.1 % — LOW (ref 34.5–45)
HCT VFR BLD CALC: 30.3 % — LOW (ref 34.5–45)
HCT VFR BLD CALC: 32.6 % — LOW (ref 34.5–45)
HCT VFR BLD CALC: 34.1 % — LOW (ref 34.5–45)
HCT VFR BLD CALC: 35.3 % — SIGNIFICANT CHANGE UP (ref 34.5–45)
HCT VFR BLDA CALC: 33 % — LOW (ref 39–50)
HGB BLD CALC-MCNC: 10.8 G/DL — LOW (ref 11.5–15.5)
HGB BLD-MCNC: 10.2 G/DL — LOW (ref 11.5–15.5)
HGB BLD-MCNC: 10.3 G/DL — LOW (ref 11.5–15.5)
HGB BLD-MCNC: 10.6 G/DL — LOW (ref 11.5–15.5)
HGB BLD-MCNC: 10.8 G/DL — LOW (ref 11.5–15.5)
HGB BLD-MCNC: 10.9 G/DL — LOW (ref 11.5–15.5)
HGB BLD-MCNC: 9.8 G/DL — LOW (ref 11.5–15.5)
HOROWITZ INDEX BLDV+IHG-RTO: 32 — SIGNIFICANT CHANGE UP
INR BLD: 1.19 RATIO — HIGH (ref 0.88–1.16)
INR BLD: 3.7 RATIO — HIGH (ref 0.88–1.16)
KETONES UR-MCNC: NEGATIVE — SIGNIFICANT CHANGE UP
LACTATE BLDV-MCNC: 0.9 MMOL/L — SIGNIFICANT CHANGE UP (ref 0.7–2)
LEUKOCYTE ESTERASE UR-ACNC: NEGATIVE — SIGNIFICANT CHANGE UP
LYMPHOCYTES # BLD AUTO: 0.4 K/UL — LOW (ref 1–3.3)
LYMPHOCYTES # BLD AUTO: 7.5 % — LOW (ref 13–44)
MAGNESIUM SERPL-MCNC: 2.2 MG/DL — SIGNIFICANT CHANGE UP (ref 1.6–2.6)
MCHC RBC-ENTMCNC: 28 PG — SIGNIFICANT CHANGE UP (ref 27–34)
MCHC RBC-ENTMCNC: 28.7 PG — SIGNIFICANT CHANGE UP (ref 27–34)
MCHC RBC-ENTMCNC: 28.8 PG — SIGNIFICANT CHANGE UP (ref 27–34)
MCHC RBC-ENTMCNC: 29.7 PG — SIGNIFICANT CHANGE UP (ref 27–34)
MCHC RBC-ENTMCNC: 30.1 PG — SIGNIFICANT CHANGE UP (ref 27–34)
MCHC RBC-ENTMCNC: 31 GM/DL — LOW (ref 32–36)
MCHC RBC-ENTMCNC: 31.2 PG — SIGNIFICANT CHANGE UP (ref 27–34)
MCHC RBC-ENTMCNC: 31.8 GM/DL — LOW (ref 32–36)
MCHC RBC-ENTMCNC: 32.5 GM/DL — SIGNIFICANT CHANGE UP (ref 32–36)
MCHC RBC-ENTMCNC: 32.7 GM/DL — SIGNIFICANT CHANGE UP (ref 32–36)
MCHC RBC-ENTMCNC: 33.6 GM/DL — SIGNIFICANT CHANGE UP (ref 32–36)
MCHC RBC-ENTMCNC: 34.7 GM/DL — SIGNIFICANT CHANGE UP (ref 32–36)
MCV RBC AUTO: 88.6 FL — SIGNIFICANT CHANGE UP (ref 80–100)
MCV RBC AUTO: 89.6 FL — SIGNIFICANT CHANGE UP (ref 80–100)
MCV RBC AUTO: 90 FL — SIGNIFICANT CHANGE UP (ref 80–100)
MCV RBC AUTO: 90.4 FL — SIGNIFICANT CHANGE UP (ref 80–100)
MCV RBC AUTO: 90.4 FL — SIGNIFICANT CHANGE UP (ref 80–100)
MCV RBC AUTO: 90.8 FL — SIGNIFICANT CHANGE UP (ref 80–100)
MONOCYTES # BLD AUTO: 0.4 K/UL — SIGNIFICANT CHANGE UP (ref 0–0.9)
MONOCYTES NFR BLD AUTO: 7.5 % — SIGNIFICANT CHANGE UP (ref 2–14)
NEUTROPHILS # BLD AUTO: 4.6 K/UL — SIGNIFICANT CHANGE UP (ref 1.8–7.4)
NEUTROPHILS NFR BLD AUTO: 82.6 % — HIGH (ref 43–77)
NITRITE UR-MCNC: NEGATIVE — SIGNIFICANT CHANGE UP
OTHER CELLS CSF MANUAL: 12 ML/DL — LOW (ref 18–22)
PCO2 BLDV: 35 MMHG — SIGNIFICANT CHANGE UP (ref 35–50)
PH BLDV: 7.43 — SIGNIFICANT CHANGE UP (ref 7.35–7.45)
PH UR: 5.5 — SIGNIFICANT CHANGE UP (ref 5–8)
PHOSPHATE SERPL-MCNC: 2.8 MG/DL — SIGNIFICANT CHANGE UP (ref 2.5–4.5)
PLATELET # BLD AUTO: 125 K/UL — LOW (ref 150–400)
PLATELET # BLD AUTO: 132 K/UL — LOW (ref 150–400)
PLATELET # BLD AUTO: 134 K/UL — LOW (ref 150–400)
PLATELET # BLD AUTO: 148 K/UL — LOW (ref 150–400)
PLATELET # BLD AUTO: 156 K/UL — SIGNIFICANT CHANGE UP (ref 150–400)
PLATELET # BLD AUTO: 163 K/UL — SIGNIFICANT CHANGE UP (ref 150–400)
PO2 BLDV: 46 MMHG — HIGH (ref 25–45)
POTASSIUM BLDV-SCNC: 3.9 MMOL/L — SIGNIFICANT CHANGE UP (ref 3.5–5.3)
POTASSIUM SERPL-MCNC: 4.2 MMOL/L — SIGNIFICANT CHANGE UP (ref 3.5–5.3)
POTASSIUM SERPL-SCNC: 4.2 MMOL/L — SIGNIFICANT CHANGE UP (ref 3.5–5.3)
PROT UR-MCNC: NEGATIVE — SIGNIFICANT CHANGE UP
PROTHROM AB SERPL-ACNC: 13.6 SEC — HIGH (ref 10–12.9)
PROTHROM AB SERPL-ACNC: 43.9 SEC — HIGH (ref 10–12.9)
RBC # BLD: 3.31 M/UL — LOW (ref 3.8–5.2)
RBC # BLD: 3.31 M/UL — LOW (ref 3.8–5.2)
RBC # BLD: 3.38 M/UL — LOW (ref 3.8–5.2)
RBC # BLD: 3.68 M/UL — LOW (ref 3.8–5.2)
RBC # BLD: 3.78 M/UL — LOW (ref 3.8–5.2)
RBC # BLD: 3.9 M/UL — SIGNIFICANT CHANGE UP (ref 3.8–5.2)
RBC # FLD: 13.9 % — SIGNIFICANT CHANGE UP (ref 10.3–14.5)
RBC # FLD: 14 % — SIGNIFICANT CHANGE UP (ref 10.3–14.5)
RBC # FLD: 14 % — SIGNIFICANT CHANGE UP (ref 10.3–14.5)
RBC # FLD: 15.3 % — HIGH (ref 10.3–14.5)
RH IG SCN BLD-IMP: POSITIVE — SIGNIFICANT CHANGE UP
SAO2 % BLDV: 79 % — SIGNIFICANT CHANGE UP (ref 67–88)
SODIUM SERPL-SCNC: 135 MMOL/L — SIGNIFICANT CHANGE UP (ref 135–145)
SP GR SPEC: 1.01 — SIGNIFICANT CHANGE UP (ref 1.01–1.02)
UROBILINOGEN FLD QL: NEGATIVE — SIGNIFICANT CHANGE UP
WBC # BLD: 3.9 K/UL — SIGNIFICANT CHANGE UP (ref 3.8–10.5)
WBC # BLD: 4.6 K/UL — SIGNIFICANT CHANGE UP (ref 3.8–10.5)
WBC # BLD: 4.7 K/UL — SIGNIFICANT CHANGE UP (ref 3.8–10.5)
WBC # BLD: 4.82 K/UL — SIGNIFICANT CHANGE UP (ref 3.8–10.5)
WBC # BLD: 5.5 K/UL — SIGNIFICANT CHANGE UP (ref 3.8–10.5)
WBC # BLD: 5.8 K/UL — SIGNIFICANT CHANGE UP (ref 3.8–10.5)
WBC # FLD AUTO: 3.9 K/UL — SIGNIFICANT CHANGE UP (ref 3.8–10.5)
WBC # FLD AUTO: 4.6 K/UL — SIGNIFICANT CHANGE UP (ref 3.8–10.5)
WBC # FLD AUTO: 4.7 K/UL — SIGNIFICANT CHANGE UP (ref 3.8–10.5)
WBC # FLD AUTO: 4.82 K/UL — SIGNIFICANT CHANGE UP (ref 3.8–10.5)
WBC # FLD AUTO: 5.5 K/UL — SIGNIFICANT CHANGE UP (ref 3.8–10.5)
WBC # FLD AUTO: 5.8 K/UL — SIGNIFICANT CHANGE UP (ref 3.8–10.5)

## 2019-09-08 PROCEDURE — 71045 X-RAY EXAM CHEST 1 VIEW: CPT | Mod: 26

## 2019-09-08 RX ORDER — PROTHROMBIN COMPLEX CONCENTRATE (HUMAN) 25.5; 16.5; 24; 22; 22; 26 [IU]/ML; [IU]/ML; [IU]/ML; [IU]/ML; [IU]/ML; [IU]/ML
1500 POWDER, FOR SOLUTION INTRAVENOUS ONCE
Refills: 0 | Status: COMPLETED | OUTPATIENT
Start: 2019-09-08 | End: 2019-09-08

## 2019-09-08 RX ORDER — OXYCODONE HYDROCHLORIDE 5 MG/1
2.5 TABLET ORAL EVERY 4 HOURS
Refills: 0 | Status: DISCONTINUED | OUTPATIENT
Start: 2019-09-08 | End: 2019-09-10

## 2019-09-08 RX ORDER — ACETAMINOPHEN 500 MG
975 TABLET ORAL EVERY 6 HOURS
Refills: 0 | Status: DISCONTINUED | OUTPATIENT
Start: 2019-09-08 | End: 2019-09-13

## 2019-09-08 RX ORDER — OXYCODONE HYDROCHLORIDE 5 MG/1
2.5 TABLET ORAL ONCE
Refills: 0 | Status: DISCONTINUED | OUTPATIENT
Start: 2019-09-08 | End: 2019-09-08

## 2019-09-08 RX ORDER — ACETAMINOPHEN 500 MG
1000 TABLET ORAL ONCE
Refills: 0 | Status: COMPLETED | OUTPATIENT
Start: 2019-09-08 | End: 2019-09-08

## 2019-09-08 RX ORDER — DOCUSATE SODIUM 100 MG
100 CAPSULE ORAL THREE TIMES A DAY
Refills: 0 | Status: DISCONTINUED | OUTPATIENT
Start: 2019-09-08 | End: 2019-09-13

## 2019-09-08 RX ORDER — DOCUSATE SODIUM 100 MG
100 CAPSULE ORAL
Refills: 0 | Status: DISCONTINUED | OUTPATIENT
Start: 2019-09-08 | End: 2019-09-08

## 2019-09-08 RX ORDER — CHLORHEXIDINE GLUCONATE 213 G/1000ML
1 SOLUTION TOPICAL DAILY
Refills: 0 | Status: DISCONTINUED | OUTPATIENT
Start: 2019-09-08 | End: 2019-09-09

## 2019-09-08 RX ORDER — SODIUM CHLORIDE 9 MG/ML
1000 INJECTION INTRAMUSCULAR; INTRAVENOUS; SUBCUTANEOUS
Refills: 0 | Status: DISCONTINUED | OUTPATIENT
Start: 2019-09-08 | End: 2019-09-08

## 2019-09-08 RX ORDER — SODIUM CHLORIDE 9 MG/ML
500 INJECTION INTRAMUSCULAR; INTRAVENOUS; SUBCUTANEOUS ONCE
Refills: 0 | Status: COMPLETED | OUTPATIENT
Start: 2019-09-08 | End: 2019-09-08

## 2019-09-08 RX ORDER — PHYTONADIONE (VIT K1) 5 MG
5 TABLET ORAL ONCE
Refills: 0 | Status: COMPLETED | OUTPATIENT
Start: 2019-09-08 | End: 2019-09-08

## 2019-09-08 RX ORDER — SENNA PLUS 8.6 MG/1
2 TABLET ORAL AT BEDTIME
Refills: 0 | Status: DISCONTINUED | OUTPATIENT
Start: 2019-09-08 | End: 2019-09-13

## 2019-09-08 RX ORDER — ENOXAPARIN SODIUM 100 MG/ML
40 INJECTION SUBCUTANEOUS EVERY 24 HOURS
Refills: 0 | Status: DISCONTINUED | OUTPATIENT
Start: 2019-09-09 | End: 2019-09-13

## 2019-09-08 RX ADMIN — PROTHROMBIN COMPLEX CONCENTRATE (HUMAN) 400 INTERNATIONAL UNIT(S): 25.5; 16.5; 24; 22; 22; 26 POWDER, FOR SOLUTION INTRAVENOUS at 03:21

## 2019-09-08 RX ADMIN — Medication 1 TABLET(S): at 21:21

## 2019-09-08 RX ADMIN — LIDOCAINE 1 PATCH: 4 CREAM TOPICAL at 17:17

## 2019-09-08 RX ADMIN — SENNA PLUS 2 TABLET(S): 8.6 TABLET ORAL at 21:21

## 2019-09-08 RX ADMIN — Medication 400 MILLIGRAM(S): at 02:09

## 2019-09-08 RX ADMIN — Medication 75 MICROGRAM(S): at 06:41

## 2019-09-08 RX ADMIN — Medication 1 TABLET(S): at 06:41

## 2019-09-08 RX ADMIN — OXYCODONE HYDROCHLORIDE 5 MILLIGRAM(S): 5 TABLET ORAL at 03:25

## 2019-09-08 RX ADMIN — OXYCODONE HYDROCHLORIDE 2.5 MILLIGRAM(S): 5 TABLET ORAL at 05:25

## 2019-09-08 RX ADMIN — Medication 975 MILLIGRAM(S): at 11:53

## 2019-09-08 RX ADMIN — Medication 975 MILLIGRAM(S): at 17:15

## 2019-09-08 RX ADMIN — OXYCODONE HYDROCHLORIDE 2.5 MILLIGRAM(S): 5 TABLET ORAL at 04:37

## 2019-09-08 RX ADMIN — Medication 5 MILLIGRAM(S): at 03:21

## 2019-09-08 RX ADMIN — PANTOPRAZOLE SODIUM 40 MILLIGRAM(S): 20 TABLET, DELAYED RELEASE ORAL at 08:00

## 2019-09-08 RX ADMIN — OXYCODONE HYDROCHLORIDE 5 MILLIGRAM(S): 5 TABLET ORAL at 17:15

## 2019-09-08 RX ADMIN — SERTRALINE 200 MILLIGRAM(S): 25 TABLET, FILM COATED ORAL at 11:53

## 2019-09-08 RX ADMIN — LIDOCAINE 1 PATCH: 4 CREAM TOPICAL at 19:44

## 2019-09-08 RX ADMIN — LIDOCAINE 1 PATCH: 4 CREAM TOPICAL at 06:44

## 2019-09-08 RX ADMIN — Medication 975 MILLIGRAM(S): at 12:23

## 2019-09-08 RX ADMIN — Medication 1 TABLET(S): at 14:38

## 2019-09-08 RX ADMIN — Medication 100 MILLIGRAM(S): at 14:37

## 2019-09-08 RX ADMIN — Medication 975 MILLIGRAM(S): at 06:44

## 2019-09-08 RX ADMIN — OXYCODONE HYDROCHLORIDE 2.5 MILLIGRAM(S): 5 TABLET ORAL at 06:41

## 2019-09-08 RX ADMIN — Medication 975 MILLIGRAM(S): at 06:41

## 2019-09-08 RX ADMIN — OXYCODONE HYDROCHLORIDE 5 MILLIGRAM(S): 5 TABLET ORAL at 17:45

## 2019-09-08 RX ADMIN — OXYCODONE HYDROCHLORIDE 2.5 MILLIGRAM(S): 5 TABLET ORAL at 06:47

## 2019-09-08 RX ADMIN — SODIUM CHLORIDE 2000 MILLILITER(S): 9 INJECTION INTRAMUSCULAR; INTRAVENOUS; SUBCUTANEOUS at 06:43

## 2019-09-08 RX ADMIN — OXYCODONE HYDROCHLORIDE 5 MILLIGRAM(S): 5 TABLET ORAL at 02:38

## 2019-09-08 RX ADMIN — Medication 975 MILLIGRAM(S): at 17:45

## 2019-09-08 RX ADMIN — Medication 100 MILLIGRAM(S): at 21:21

## 2019-09-08 RX ADMIN — Medication 1000 MILLIGRAM(S): at 02:09

## 2019-09-08 NOTE — PROGRESS NOTE ADULT - SUBJECTIVE AND OBJECTIVE BOX
Trauma Surgery Progress Note     SUBJECTIVE: Pt seen and examined at bedside. Overnight patient was transferred to the SICU due to downtrending HCT and asymptomatic hypotension. She was supra-therapeutically anticoagulated.     MEDICATIONS  (STANDING):  acetaminophen   Tablet .. 650 milliGRAM(s) Oral every 6 hours  calcium carbonate 1250 mG  + Vitamin D (OsCal 500 + D) 1 Tablet(s) Oral three times a day  chlorhexidine 2% Cloths 1 Application(s) Topical daily  docusate sodium 100 milliGRAM(s) Oral two times a day  influenza   Vaccine 0.5 milliLiter(s) IntraMuscular once  levothyroxine 75 MICROGram(s) Oral daily  lidocaine   Patch 1 Patch Transdermal every 24 hours  OLANZapine 5 milliGRAM(s) Oral <User Schedule>  pantoprazole    Tablet 40 milliGRAM(s) Oral before breakfast  senna 2 Tablet(s) Oral at bedtime  sertraline 200 milliGRAM(s) Oral daily    MEDICATIONS  (PRN):  oxyCODONE    IR 5 milliGRAM(s) Oral every 6 hours PRN Severe Pain (7 - 10)      OBJECTIVE:    Vital Signs Last 24 Hrs  T(C): 36.9 (08 Sep 2019 00:09), Max: 36.9 (08 Sep 2019 00:09)  T(F): 98.4 (08 Sep 2019 00:09), Max: 98.4 (08 Sep 2019 00:09)  HR: 76 (08 Sep 2019 00:09) (68 - 96)  BP: 99/61 (08 Sep 2019 00:09) (99/61 - 145/72)  BP(mean): --  RR: 18 (08 Sep 2019 00:09) (16 - 18)  SpO2: 97% (08 Sep 2019 00:09) (95% - 98%)    General Appearance: NAD, alert and oriented x 4  Neck: Supple  Chest: non-labored breathing, no respiratory distress, equal chest rise  CV: Pulse regular presently  Abdomen: Soft, non-tender, non-distended  Extremities: warm and well perfused  Pelvis: right superior pubic ramus fracture, pain with palpation     I&O's Summary    07 Sep 2019 07:01  -  08 Sep 2019 04:41  --------------------------------------------------------  IN: 200 mL / OUT: 650 mL / NET: -450 mL      I&O's Detail    07 Sep 2019 07:01  -  08 Sep 2019 04:41  --------------------------------------------------------  IN:    Oral Fluid: 200 mL  Total IN: 200 mL    OUT:    Voided: 650 mL  Total OUT: 650 mL    Total NET: -450 mL            LABS:                        10.8   5.5   )-----------( 148      ( 08 Sep 2019 03:53 )             34.1     09-08    135  |  100  |  27<H>  ----------------------------<  127<H>  4.2   |  20<L>  |  1.40<H>    Ca    8.4      08 Sep 2019 03:53  Phos  2.8     09-08  Mg     2.2     09-08    TPro  7.2  /  Alb  4.3  /  TBili  0.3  /  DBili  x   /  AST  28  /  ALT  33  /  AlkPhos  61  09-07    PT/INR - ( 08 Sep 2019 03:53 )   PT: 13.6 sec;   INR: 1.19 ratio         PTT - ( 08 Sep 2019 03:53 )  PTT:32.9 sec      RADIOLOGY & ADDITIONAL STUDIES:

## 2019-09-08 NOTE — CONSULT NOTE ADULT - ATTENDING COMMENTS
Pt seen and examined.  Exam and plan as above.  WBAT.  Monitor H/H. Other care as per trauma/ICU
pt seen and examined, Rt sacral, fracture, pubic rami fracture, supertheraputic INR coumadin, hypotension.    pain control  Supplemental O2 as needed  Iv hydration, trend labs, hemorrhage watch protocol  Kcentra to reverses INR,  hold A/C  Hold anti hypertensives for now  Resume other home meds  Diet as tolerated  Mechanical DVT prophylaxis, Gi prophylaxis

## 2019-09-08 NOTE — PROGRESS NOTE ADULT - ASSESSMENT
A/P: 85 y/o F with a history of Afib on Coumadin, hypothyroid, Takotsubo cardiomyopathy, pacemaker, who presents after a fall found to have right 12th rib fracture and right superior pubic rami fracture with adjacent hematoma. SICU consulted for hemorrhage watch and INR reversal given acute drop in H/H and persistently elevated INR.    -Care per SICU  -multimodal pain control  -regular diet  -s/p  phytonadione 5 mg for INR reversal, Kcentra for rapid reversal   -hemorrhage watch Q1 CBC and lactate x3  -transfuse as needed    ACS [id number] A/P: 85 y/o F with a history of Afib on Coumadin, hypothyroid, Takotsubo cardiomyopathy, pacemaker, who presents after a fall found to have right 12th rib fracture and right superior pubic rami fracture with adjacent hematoma. SICU consulted for hemorrhage watch and INR reversal given acute drop in H/H and persistently elevated INR.    -Care per SICU  -multimodal pain control  -regular diet  -s/p  phytonadione 5 mg for INR reversal, Kcentra for rapid reversal   -hemorrhage watch Q1 CBC and lactate x3  -transfuse as needed  -dvt ppx if repeat cbc stable, CT if downtrending    ACS [id number]

## 2019-09-08 NOTE — CHART NOTE - NSCHARTNOTEFT_GEN_A_CORE
TERTIARY TRAUMA SURVERY  ------------------------------------------------------------------------------------    Date of TTS: 09-09-19  Time: 03:58  Admit Date: 09-07-19    HPI:  This is an 84 year old female, on coumadin for atrial fibrillation, lives in assisted living, ambulate with walker/cane presents after a ground level fall today at 6 am. She has been having diarrhea as she was started on antibiotics for her UTI. She did not lose consciousness. She states she felt dizzy and fell but recalls the whole event. She was trying to use the bathroom when she fell.She did not hit her head. She c/o R hip pain and inability to ambulate after the mechanical fall this morning.  She has [employer] pacemaker. Denies HS/LOC. Denies numbness/tingling. Denies fever/chills. Denies pain/injury elsewhere.     ROS: 10-system review is otherwise negative except HPI above.      PAST MEDICAL & SURGICAL HISTORY:  Hearing impairment  Intracranial hypotension  Depression  HLD (hyperlipidemia)  Takotsubo cardiomyopathy  Atrial fibrillation  Osteoporosis  Hypothyroid  Elective surgery: lymph node surgery  Cardiac pacemaker  S/P appendectomy  S/P tonsillectomy      FAMILY HISTORY:      SOCIAL HISTORY: not smoker, does not drink alcohol     MEDICATIONS:    Home Medications:  synthroid 75 mcg  prolia 60 mg into skin every 6 months   warfarin 7.5 mg daily + 1/2 of the 7.5 tablet in addition to the 7.5 twice weekly (Monday and Thursday)   metoprolol 25 mg as needed   protonix 40 mg   ranitidine 300 mg night   zoloft 100 mgx2 daily   aprazolam 0.25 mg x 2 daily   olanzapine 5 mg bedtime 3x week    MEDICATIONS  (PRN):    Allergies    bacitracin (Unknown)  penicillin (Unknown)    Intolerances        Vital Signs Last 24 Hrs  T(C): 36.8 (07 Sep 2019 06:53), Max: 36.8 (07 Sep 2019 06:53)  T(F): 98.3 (07 Sep 2019 06:53), Max: 98.3 (07 Sep 2019 06:53)  HR: 96 (07 Sep 2019 06:53) (96 - 96)  BP: 132/62 (07 Sep 2019 06:53) (132/62 - 132/62)  BP(mean): --  RR: 16 (07 Sep 2019 06:53) (16 - 16)  SpO2: 98% (07 Sep 2019 06:53) (98% - 98%)  Daily Height in cm: 165.1 (07 Sep 2019 06:53)        Primary Survey:    A - airway intact  B - bilateral breath sounds and good chest rise  C - initial BP: 132/62 (09-07-19 @ 06:53)*** , HR: 96 (09-07-19 @ 06:53)*** , palpable pulses in all extremities  D - GCS 15 on arrival  Exposure obtained    Secondary Survey:   General: NAD  HEENT: Normocephalic, atraumatic, EOMI, PEERLA.  Neck: Soft, midline trachea  Chest: No chest wall tenderness, thorax stable, no ecchymosis.   Cardiac: irregularly irregular   Respiratory: Bilateral breath sounds, clear and equal bilaterally.   Abdomen: Soft, non-distended, non-tender, no rebound, no guarding, no ecchymosis.   Pelvis: Stable, non-tender.   Ext: palp radial b/l UE, b/l DP palp in Lower Extrem.   Back: not examined                               13.1   7.1   )-----------( 202      ( 07 Sep 2019 07:42 )             42.1     09-07    140  |  100  |  17  ----------------------------<  125<H>  3.6   |  26  |  1.34<H>    Ca    9.4      07 Sep 2019 07:42    TPro  7.2  /  Alb  4.3  /  TBili  0.3  /  DBili  x   /  AST  28  /  ALT  33  /  AlkPhos  61  09-07    PT/INR - ( 07 Sep 2019 07:42 )   PT: 39.8 sec;   INR: 3.33 ratio         PTT - ( 07 Sep 2019 07:42 )  PTT:29.6 sec (07 Sep 2019 15:11)      INTERVAL EVENTS: ***    PAST MEDICAL & SURGICAL HISTORY:  Hearing impairment  Intracranial hypotension  Depression  HLD (hyperlipidemia)  Takotsubo cardiomyopathy  Atrial fibrillation  Osteoporosis  Hypothyroid  Elective surgery: lymph node surgery  Cardiac pacemaker  S/P appendectomy  S/P tonsillectomy    [] No significant past history as reviewed with the patient and family    FAMILY HISTORY:    [] Family history not pertinent as reviewed with the patient and family    SOCIAL HISTORY: ***    ALLERGIES: bacitracin (Unknown)  penicillin (Unknown)      HOME MEDICATIONS: ***    CURRENT MEDICATIONS  acetaminophen   Tablet .. 975 milliGRAM(s) Oral every 6 hours  calcium carbonate 1250 mG  + Vitamin D (OsCal 500 + D) 1 Tablet(s) Oral three times a day  chlorhexidine 2% Cloths 1 Application(s) Topical daily  docusate sodium 100 milliGRAM(s) Oral three times a day  enoxaparin Injectable 40 milliGRAM(s) SubCutaneous every 24 hours  influenza   Vaccine 0.5 milliLiter(s) IntraMuscular once  levothyroxine 75 MICROGram(s) Oral daily  lidocaine   Patch 1 Patch Transdermal every 24 hours  OLANZapine 5 milliGRAM(s) Oral <User Schedule>  oxyCODONE    IR 5 milliGRAM(s) Oral every 6 hours PRN  oxyCODONE    IR 2.5 milliGRAM(s) Oral every 4 hours PRN  pantoprazole    Tablet 40 milliGRAM(s) Oral before breakfast  potassium chloride    Tablet ER 20 milliEquivalent(s) Oral once  senna 2 Tablet(s) Oral at bedtime  sertraline 200 milliGRAM(s) Oral daily  sodium phosphate IVPB 15 milliMole(s) IV Intermittent once    -----------------------------------------------------------------------------------    VITAL SIGNS:  T(C): 37 (09-09-19 @ 03:00), Max: 37.1 (09-08-19 @ 07:00)  HR: 61 (09-09-19 @ 03:00) (60 - 70)  BP: 95/52 (09-09-19 @ 03:00) (80/51 - 118/58)  RR: 13 (09-09-19 @ 03:00) (12 - 26)  SpO2: 99% (09-09-19 @ 03:00) (94% - 99%)          09-07-19 @ 07:01  -  09-08-19 @ 07:00  --------------------------------------------------------  IN: 940 mL / OUT: 650 mL / NET: 290 mL    09-08-19 @ 07:01  -  09-09-19 @ 03:58  --------------------------------------------------------  IN: 1850 mL / OUT: 600 mL / NET: 1250 mL        PHYSICAL EXAM:  ***  General: NAD, Sitting in bed comfortably, not irratable   HEENT: NC/AT, EOMI, PERRL, MMM,   Neck: Soft, supple. Full ROM w/o pain  Cardio: RRR, nml S1/S2. No m/r/g. No LE edema appreciated  Resp: Good effort, CTA b/l  Thorax: No chest wall tenderness  Breast: No lesions/masses, no drainage  GI/Abd: Soft, NT/ND, no rebound/guarding, no masses palpated  Vascular: Extremities warm, brisk cap refill, B/l radial pulses palpable, b/l DP/PT palpable, no palpable abdominal pulsatile mass.  Sensation grossly intact to light touch and equal b/l in UEe and LE  Skin: Intact, no breakdown  Lymphatic/Nodes: No palpable lymphadenopathy  Musculoskeletal: All 4 extremities moving spontaneously, no limitations    LABS:  Sodium, Serum: 135 (09-09-19 @ 01:08)  Potassium, Serum: 3.8 (09-09-19 @ 01:08)  Chloride, Serum: 103 (09-09-19 @ 01:08)  Carbon Dioxide, Serum: 21 <L> (09-09-19 @ 01:08)  Blood Urea Nitrogen, Serum: 22 (09-09-19 @ 01:08)  Creatinine, Serum: 1.04 (09-09-19 @ 01:08)  WBC Count: 5.1 (09-09-19 @ 01:08)  Hemoglobin: 10.2 <L> (09-09-19 @ 01:08)  Hematocrit: 30.6 <L> (09-09-19 @ 01:08)  Platelet Count - Automated: 131 <L> (09-09-19 @ 01:08)  WBC Count: 4.7 (09-08-19 @ 16:28)  Hemoglobin: 10.3 <L> (09-08-19 @ 16:28)  Hematocrit: 29.8 <L> (09-08-19 @ 16:28)  Platelet Count - Automated: 125 <L> (09-08-19 @ 16:28)  WBC Count: 3.9 (09-08-19 @ 11:53)  Hemoglobin: 10.2 <L> (09-08-19 @ 11:53)  Hematocrit: 30.3 <L> (09-08-19 @ 11:53)  Platelet Count - Automated: 132 <L> (09-08-19 @ 11:53)  WBC Count: 4.6 (09-08-19 @ 08:14)  Hemoglobin: 9.8 <L> (09-08-19 @ 08:14)  Hematocrit: 30.1 <L> (09-08-19 @ 08:14)  Platelet Count - Automated: 134 <L> (09-08-19 @ 08:14)        ------------------------------------------------------------------------------------------  RADIOLOGICAL FINDINGS REVIEW:            List Injuries Identified to Date:  ***        Consults (Date):  [] Neurosurgery   [] Orthopedic Surgery  [] Spine Surgery  [] Plastic Surgery  [] ENT  [] Urology  [] PM&R  [] Social Work    INTERPRETATION/ASSESSMENT:   84y girl arrived as level ____ trauma activation p/w _____. Pt now stable and recovering appropriately from her injuries.     PLAN:   -   - TERTIARY TRAUMA SURVERY  ------------------------------------------------------------------------------------    Date of TTS: 09-09-19  Time: 03:58  Admit Date: 09-07-19    HPI:  This is an 84 year old female, on coumadin for atrial fibrillation, lives in assisted living, ambulate with walker/cane presents after a ground level fall today at 6 am. She has been having diarrhea as she was started on antibiotics for her UTI. She did not lose consciousness. She states she felt dizzy and fell but recalls the whole event. She was trying to use the bathroom when she fell.She did not hit her head. She c/o R hip pain and inability to ambulate after the mechanical fall this morning.  She has [employer] pacemaker. Denies HS/LOC. Denies numbness/tingling. Denies fever/chills. Denies pain/injury elsewhere.      ROS: 10-system review is otherwise negative except HPI above.      PAST MEDICAL & SURGICAL HISTORY:  Hearing impairment  Intracranial hypotension  Depression  HLD (hyperlipidemia)  Takotsubo cardiomyopathy  Atrial fibrillation  Osteoporosis  Hypothyroid  Elective surgery: lymph node surgery  Cardiac pacemaker  S/P appendectomy  S/P tonsillectomy      SOCIAL HISTORY: not smoker, does not drink alcohol     MEDICATIONS:    Home Medications:  synthroid 75 mcg  prolia 60 mg into skin every 6 months   warfarin 7.5 mg daily + 1/2 of the 7.5 tablet in addition to the 7.5 twice weekly (Monday and Thursday)   metoprolol 25 mg as needed   protonix 40 mg   ranitidine 300 mg night   zoloft 100 mgx2 daily   aprazolam 0.25 mg x 2 daily   olanzapine 5 mg bedtime 3x week    MEDICATIONS  (PRN):    Allergies    bacitracin (Unknown)  penicillin (Unknown)    Vital Signs Last 24 Hrs  T(C): 36.8 (07 Sep 2019 06:53), Max: 36.8 (07 Sep 2019 06:53)  T(F): 98.3 (07 Sep 2019 06:53), Max: 98.3 (07 Sep 2019 06:53)  HR: 96 (07 Sep 2019 06:53) (96 - 96)  BP: 132/62 (07 Sep 2019 06:53) (132/62 - 132/62)  BP(mean): --  RR: 16 (07 Sep 2019 06:53) (16 - 16)  SpO2: 98% (07 Sep 2019 06:53) (98% - 98%)  Daily Height in cm: 165.1 (07 Sep 2019 06:53)        Primary Survey:    A - airway intact  B - bilateral breath sounds and good chest rise  C - initial BP: 132/62 (09-07-19 @ 06:53)*** , HR: 96 (09-07-19 @ 06:53)*** , palpable pulses in all extremities  D - GCS 15 on arrival  Exposure obtained    Secondary Survey:   General: NAD  HEENT: Normocephalic, atraumatic, EOMI, PEERLA.  Neck: Soft, midline trachea  Chest: No chest wall tenderness, thorax stable, no ecchymosis.   Cardiac: irregularly irregular   Respiratory: Bilateral breath sounds, clear and equal bilaterally.   Abdomen: Soft, non-distended, non-tender, no rebound, no guarding, no ecchymosis.   Pelvis: Stable, non-tender.   Ext: palp radial b/l UE, b/l DP palp in Lower Extrem.   Back: not examined                               13.1   7.1   )-----------( 202      ( 07 Sep 2019 07:42 )             42.1     09-07    140  |  100  |  17  ----------------------------<  125<H>  3.6   |  26  |  1.34<H>    Ca    9.4      07 Sep 2019 07:42    TPro  7.2  /  Alb  4.3  /  TBili  0.3  /  DBili  x   /  AST  28  /  ALT  33  /  AlkPhos  61  09-07    PT/INR - ( 07 Sep 2019 07:42 )   PT: 39.8 sec;   INR: 3.33 ratio         PTT - ( 07 Sep 2019 07:42 )  PTT:29.6 sec (07 Sep 2019 15:11)      INTERVAL EVENTS:   Found to have right 12th rib fracture and right superior pubic rami fracture with adjacent hematoma.  Initially admitted to floor but found to have persistently elevated INR with acute drop in H/H from 13.1/42.1 to 10.6/32.6. SICU consulted for hemorrhage watch and INR reversal overnight and today HCT has been stable 30.1 > 30.3 > 29.8 > 30.6.      PAST MEDICAL & SURGICAL HISTORY:  Hearing impairment  Intracranial hypotension  Depression  HLD (hyperlipidemia)  Takotsubo cardiomyopathy  Atrial fibrillation  Osteoporosis  Hypothyroid  Elective surgery: lymph node surgery  Cardiac pacemaker  S/P appendectomy  S/P tonsillectomy      SOCIAL HISTORY:   Patient lives alone in an Adult independent living, is  independent with ADL's and a community ambulator. Has rolling walker and cane  at home.    ALLERGIES: bacitracin (Unknown)  penicillin (Unknown)      HOME MEDICATIONS:   synthroid 75 mcg  prolia 60 mg into skin every 6 months   warfarin 7.5 mg daily + 1/2 of the 7.5 tablet in addition to the 7.5 twice weekly (Monday and Thursday)   metoprolol 25 mg as needed   protonix 40 mg   ranitidine 300 mg night   zoloft 100 mgx2 daily   aprazolam 0.25 mg x 2 daily   olanzapine 5 mg bedtime 3x week    CURRENT MEDICATIONS  acetaminophen   Tablet .. 975 milliGRAM(s) Oral every 6 hours  calcium carbonate 1250 mG  + Vitamin D (OsCal 500 + D) 1 Tablet(s) Oral three times a day  chlorhexidine 2% Cloths 1 Application(s) Topical daily  docusate sodium 100 milliGRAM(s) Oral three times a day  enoxaparin Injectable 40 milliGRAM(s) SubCutaneous every 24 hours  influenza   Vaccine 0.5 milliLiter(s) IntraMuscular once  levothyroxine 75 MICROGram(s) Oral daily  lidocaine   Patch 1 Patch Transdermal every 24 hours  OLANZapine 5 milliGRAM(s) Oral <User Schedule>  oxyCODONE    IR 5 milliGRAM(s) Oral every 6 hours PRN  oxyCODONE    IR 2.5 milliGRAM(s) Oral every 4 hours PRN  pantoprazole    Tablet 40 milliGRAM(s) Oral before breakfast  potassium chloride    Tablet ER 20 milliEquivalent(s) Oral once  senna 2 Tablet(s) Oral at bedtime  sertraline 200 milliGRAM(s) Oral daily  sodium phosphate IVPB 15 milliMole(s) IV Intermittent once    -----------------------------------------------------------------------------------    VITAL SIGNS:  T(C): 37 (09-09-19 @ 03:00), Max: 37.1 (09-08-19 @ 07:00)  HR: 61 (09-09-19 @ 03:00) (60 - 70)  BP: 95/52 (09-09-19 @ 03:00) (80/51 - 118/58)  RR: 13 (09-09-19 @ 03:00) (12 - 26)  SpO2: 99% (09-09-19 @ 03:00) (94% - 99%)          09-07-19 @ 07:01  -  09-08-19 @ 07:00  --------------------------------------------------------  IN: 940 mL / OUT: 650 mL / NET: 290 mL    09-08-19 @ 07:01  -  09-09-19 @ 03:58  --------------------------------------------------------  IN: 1850 mL / OUT: 600 mL / NET: 1250 mL        PHYSICAL EXAM:   General: NAD, Sitting in bed comfortably  HEENT: NC/AT, EOMI, PERRL, MMM  Neck: Soft, supple. Full ROM w/o pain  Cardio: RRR. No LE edema appreciated  Resp: Moderate respiratory effort.   Thorax: No chest wall tenderness. R. lank tenderness to palpation.  GI/Abd: Soft, NT/ND, no rebound/guarding, no masses palpated  Vascular: Extremities warm, brisk cap refill, B/l radial pulses palpable, b/l DP/PT palpable, no palpable abdominal pulsatile mass.  Sensation grossly intact to light touch and equal b/l in UEe and LE  Skin: Intact, no breakdown  Lymphatic/Nodes: No palpable lymphadenopathy  Musculoskeletal: All 4 extremities moving spontaneously. Hip pain with LE ROM.     LABS:  Sodium, Serum: 135 (09-09-19 @ 01:08)  Potassium, Serum: 3.8 (09-09-19 @ 01:08)  Chloride, Serum: 103 (09-09-19 @ 01:08)  Carbon Dioxide, Serum: 21 <L> (09-09-19 @ 01:08)  Blood Urea Nitrogen, Serum: 22 (09-09-19 @ 01:08)  Creatinine, Serum: 1.04 (09-09-19 @ 01:08)  WBC Count: 5.1 (09-09-19 @ 01:08)  Hemoglobin: 10.2 <L> (09-09-19 @ 01:08)  Hematocrit: 30.6 <L> (09-09-19 @ 01:08)  Platelet Count - Automated: 131 <L> (09-09-19 @ 01:08)  WBC Count: 4.7 (09-08-19 @ 16:28)  Hemoglobin: 10.3 <L> (09-08-19 @ 16:28)  Hematocrit: 29.8 <L> (09-08-19 @ 16:28)  Platelet Count - Automated: 125 <L> (09-08-19 @ 16:28)  WBC Count: 3.9 (09-08-19 @ 11:53)  Hemoglobin: 10.2 <L> (09-08-19 @ 11:53)  Hematocrit: 30.3 <L> (09-08-19 @ 11:53)  Platelet Count - Automated: 132 <L> (09-08-19 @ 11:53)  WBC Count: 4.6 (09-08-19 @ 08:14)  Hemoglobin: 9.8 <L> (09-08-19 @ 08:14)  Hematocrit: 30.1 <L> (09-08-19 @ 08:14)  Platelet Count - Automated: 134 <L> (09-08-19 @ 08:14)        ------------------------------------------------------------------------------------------  RADIOLOGICAL FINDINGS REVIEW:      EXAM:  XR HIP WITH PELV 2-3V RT                        PROCEDURE DATE:  09/07/2019    Impression:  Fracture superior and inferior ramus of the right pubic bone. Correlate   with CT scan that was performed the same date. The fractured sacrum   cannot be appreciated the current study however seen on CT scan.    EXAM:  CT PELVIS BONY ONLY                                           PROCEDURE DATE:  09/07/2019    CT OF THE  PELVIS    CLINICAL INDICATION: Right hip/groin pain status post fall.  TECHNIQUE: Multidetector CT of the pelvis. The study was performed   without the use of intravenous or intra-articular contrast. Multiplanar   reformats were generated for review. Three dimensional reconstructions   were obtained on an independent workstation. The interpretation of these   images is included in the body of the report of the main portion of the   study.     COMPARISON: Pelvis and right hip radiographs 7 September 2019.    FINDINGS:    BONE: Fracture of the right sacral ala. Oblique minimally displaced   fractures of the right superior and inferior pubic rami. No additional   acute fractures are identified. Multilevel facet arthropathy and   degenerative change of the lumbar spine. Degeneration the bilateral SI   joints. Bilateral hip cartilage space narrowing and marginal osteophyte   formation.    SOFT TISSUE: Small amount of edema/hematoma is seen in the presacral   space tracking into the right piriformis muscle. In addition, a small   hematoma is identified adjacent to the right superior pubic ramus   fracture. Mild symmetric atrophy of the muscles. Normal CT appearance of   the imaged tendons. Vascular calcifications. Neurovascular structures are   otherwise normal in course and caliber. Normal CT appearance of the   imaged bowel and pelvic organs. No lymphadenopathy.      IMPRESSION:  1.  Acute fractures of the right superior and inferior pubic rami.  2.  Acute minimally displaced fracture of the right sacral ala.  3.  Small hematomas are identified adjacent to the right sacral ala and   right superior pubic ramus fractures.    EXAM:  CT BRAIN                        PROCEDURE DATE:  09/07/2019   FINDINGS:   There is no CT evidence of acute intracranial hemorrhage, extra-axial   collection, vasogenic edema, mass effect, midline shift, central   herniation, or hydrocephalus.     There is age-appropriate cerebral volume loss. There is periventricular   patchy white matter hypoattenuation which is nonspecific in etiology but   likely related to chronic microvascular ischemic disease.    The visualized paranasal sinuses are clear. The mastoid air cells and   middle ear cavities are clear.    The soft tissues of the scalp are unremarkable. The calvarium is intact.    IMPRESSION:   No CT evidence of acute intracranial hemorrhage, brain edema, mass effect   or acute territorial infarct.     There is a right extraperitoneal hematoma adjacent to the right superior   pubic ramus fracture measuring approximately 6.1 x 2.7 cm as well as   trace hemorrhage in the presacral space. These findings were discussed   with Dr. Garza at 9/7/2019 2:55 PM by Dr. Hayden with read back   confirmation.      *** END OF ADDENDUM 09/07/2019  ***      PROCEDURE DATE:  09/07/2019    COMPARISON: CTA chest 11/11/2014    PROCEDURE:   CT of the Chest, Abdomen and Pelvis was performed with intravenous   contrast.   Imaging was performed through the chest in the arterial phase followed by   imaging of the abdomen and pelvis in the portal venous phase.  Intravenous contrast: 90 ml Omnipaque 350. 10 ml discarded.  Oral contrast: None.  Sagittal and coronal reformats were performed.    FINDINGS:    CHEST:     LUNGS AND LARGE AIRWAYS: The airways are unremarkable. The lungs are   clear. Linear atelectasis within the left lower lobe.  PLEURA: No pleural effusion. No pneumothorax.  VESSELS: Atheromatous disease.  HEART: Cardiomegaly. No pericardial effusion. Status post left-sided   pacemaker placement.  MEDIASTINUM AND MACY: No lymphadenopathy. Small hiatal hernia.  CHEST WALL AND LOWER NECK: Left sided chest wall pacemaker placement.    ABDOMEN AND PELVIS:    LIVER: Within normal limits.  BILE DUCTS: Mild dilatation of the extrahepatic common bile duct up to   1.1 cm. No intrahepatic ductal dilatation seen.  GALLBLADDER: Cholelithiasis.  SPLEEN: Within normal limits.  PANCREAS: Prominent pancreatic duct up to 3 mm. This appears slightly   increased compared to prior CT from 11/11/2014.  ADRENALS: Within normal limits.  KIDNEYS/URETERS: On the right side, there is a 2 mm nonobstructing renal   calculus within the upper pole. Right hilar pelvic cysts. No   hydronephrosis. On the left side, there is a punctate nonobstructing   renal calculus within the left renal pelvis. No hydronephrosis.    BLADDER: Within normal limits.  REPRODUCTIVE ORGANS: The uterus is unremarkable. No adnexal masses.    BOWEL: No bowel obstruction. Appendix is not visualized.  PERITONEUM: No ascites.  VESSELS: Atheromatous disease  RETROPERITONEUM/LYMPH NODES: No lymphadenopathy.    ABDOMINAL WALL: Within normal limits.  BONES: Minimally displaced acute fracture of the posterior aspect of the   right 12th rib. Acute fracture of the right sacral ala as well as   obliquely oriented minimally displaced fractures of the right superior   and inferior pubic rami. Age indeterminate compression deformity of the   L1 vertebral body. Degenerative changes of the spine.    IMPRESSION:     Minimally displaced acute fracture of the posterior aspect of the right   12th rib. Acute fracture of the right sacral ala as well as obliquely   oriented minimally displaced fractures of the right superior and inferior   pubic rami. Age indeterminate compression deformity of the L1 vertebral   body.    List Injuries Identified to Date:    Acute fractures of the right superior and inferior pubic rami.  Acute minimally displaced fracture of the right sacral ala.  Minimally displaced acute fracture of the posterior aspect of the right   12th rib.    Consults (Date):  [] Neurosurgery   [x] Orthopedic Surgery  [] Spine Surgery  [] Plastic Surgery  [] ENT  [] Urology  [] PM&R  [] Social Work    INTERPRETATION/ASSESSMENT:   83 y/o F with a history of Afib on Coumadin, hypothyroid, Takotsubo cardiomyopathy, pacemaker, who presents after a fall found to have right 12th rib fracture and right superior pubic rami fracture with adjacent hematoma. SICU consulted for hemorrhage watch and INR reversal given acute drop in H/H and persistently elevated INR.    -Care per SICU  -multimodal pain control  -regular diet  -s/p  phytonadione 5 mg for INR reversal, Kcentra for rapid reversal   -hemorrhage watch Q1 CBC and lactate x3  -transfuse as needed  -dvt ppx if repeat cbc stable, CT if downtrending    ACS [id number]

## 2019-09-08 NOTE — PROVIDER CONTACT NOTE (OTHER) - ACTION/TREATMENT ORDERED:
No interventions needed at this time. Will continue to monitor CBC q4 hours. Will continue to monitor and update as needed.

## 2019-09-08 NOTE — CHART NOTE - NSCHARTNOTEFT_GEN_A_CORE
Patient admitted earlier the evening prior with R 12th rib fracture and R superior pubic rami fracture, with adjacent pelvic hematoma (with no evidence of active extravasation on CTA. She is anticoagulated on warfarin for atrial fibrillation. Admission HCT was 42.1, and admission INR was 3.33. As she was hemodynamically stable on admission, INR was not actively reversed.   Repeated CBC this evening has demonstrated a downtrending HCT. She is currently complaining of pelvic pain (similar to admission), but is otherwise comfortable. She reports no dizziness, palpitations or chest pain.    Vital Signs Last 24 Hrs  T(C): 36.9 (08 Sep 2019 00:09), Max: 36.9 (08 Sep 2019 00:09)  T(F): 98.4 (08 Sep 2019 00:09), Max: 98.4 (08 Sep 2019 00:09)  HR: 76 (08 Sep 2019 00:09) (68 - 96)  BP: 99/61 (08 Sep 2019 00:09) (99/61 - 145/72)  BP(mean): --  RR: 18 (08 Sep 2019 00:09) (16 - 18)  SpO2: 97% (08 Sep 2019 00:09) (95% - 98%)    Complete Blood Count (09.08.19 @ 00:55)    WBC Count: 4.82 K/uL    RBC Count: 3.68 M/uL    Hemoglobin: 10.6 g/dL    Hematocrit: 32.6 %    Mean Cell Volume: 88.6 fl    Mean Cell Hemoglobin: 28.8 pg    Mean Cell Hemoglobin Conc: 32.5 gm/dL    Red Cell Distrib Width: 15.3 %    Platelet Count - Automated: 163 K/uL    Prothrombin Time and INR, Plasma in AM (09.07.19 @ 23:37)    Prothrombin Time, Plasma: 43.9: Effective October 30th, 2018 the reference range for PT has changed. sec    INR: 3.70: RECOMMENDED RANGES FOR THERAPEUTIC INR:    2.0-3.0 for most medical and surgical thromboembolic states    2.0-3.0 for atrial fibrillation    2.0-3.0 for bileaflet mechanical valve in aortic position    2.5-3.5 for mechanical heart valves   Chest 2004;[id number]  The presence of direct thrombin inhibitors (argatroban, refludan)  may falsely increase results. ratio    GEN: NAD, alert and oriented x 3  HEENT: WNL  CHEST: Symmetrical chest rise, no increased work of breathing, no stridor  HEART: RRR, non-muffled heart sounds  ABD: Non-distended and soft. Minimal supra-pubic tenderness  EXT: No erythema/edema. Warm, sensate, motor function intact        ASSESSMENT  84yFemale, admitted with R 12th rib fracture and R superior pubic rami fracture, with downtrending HCT and asymptomatic hypotension. She remains supra-therapeutically anticoagulated.      PLAN  - SICU consult called, unit is ready to accept patient for transfer and hemorrhage watch  - Fully reverse anticoagulation with prothrombin complex concentrate  - Trend CBC, close hemodynamic monitoring  - Transfuse as needed  - If CBC continues downward trend after INR reversal, patient may require repeat CTA of the abdomen/pelvis      Discussed with Attending (Jefry)      Say Kingsley  Chief Resident, General Surgery

## 2019-09-08 NOTE — CONSULT NOTE ADULT - SUBJECTIVE AND OBJECTIVE BOX
SICU CONSULT NOTE    HPI  GINGER NORIEGA is a  84 year old female, on coumadin for atrial fibrillation, lives in assisted living, ambulate with walker/cane presents after a ground level fall today at 6 am. She has been having diarrhea as she was started on antibiotics for her UTI. She did not lose consciousness. She states she felt dizzy and fell but recalls the whole event. She was trying to use the bathroom when she fell.She did not hit her head. She c/o R hip pain and inability to ambulate after the mechanical fall this morning.  She has [employer] pacemaker. Denies HS/LOC. Denies numbness/tingling. Denies fever/chills. Denies pain/injury elsewhere.     Initially admitted to floor but found to have persistently elevated INR with acute drop in H/H from 13.1/42.1 to 10.6/32.6. SICU consulted for hemorrhage watch and INR reversal.    PAST MEDICAL HISTORY: Hearing impairment  Intracranial hypotension  Depression  HLD (hyperlipidemia)  Takotsubo cardiomyopathy  Atrial fibrillation  Osteoporosis  Hypothyroid      PAST SURGICAL HISTORY: Elective surgery  Cardiac pacemaker  S/P appendectomy  S/P tonsillectomy      FAMILY HISTORY:     SOCIAL HISTORY:    CODE STATUS:     HOME MEDICATIONS:  Home Medications:  ALPRAZolam 0.5 mg oral tablet: 0.25 milligram(s) orally once a day (07 Sep 2019 15:18)  metoprolol: 25 milligram(s) orally once a day (07 Sep 2019 15:18)  OLANZapine 5 mg oral tablet: milligram(s) orally 3 times a week (07 Sep 2019 15:18)  Prolia 60 mg/mL subcutaneous solution:  (07 Sep 2019 15:18)  Protonix 40 mg oral delayed release tablet: 1 tab(s) orally once a day (07 Sep 2019 15:18)  raNITIdine 300 mg oral tablet: 1 tab(s) orally once a day (at bedtime) (07 Sep 2019 15:18)  Synthroid 75 mcg (0.075 mg) oral tablet: 1 tab(s) orally once a day (07 Sep 2019 15:18)  warfarin 7.5 mg oral tablet: 1 tab(s) orally once a day, On Monday and Thursday, the patient takes 10 mg  (07 Sep 2019 15:18)  Zoloft 100 mg oral tablet: 2 tab(s) orally once a day (07 Sep 2019 15:18)      ALLERGIES: bacitracin (Unknown)  penicillin (Unknown)      VITAL SIGNS:  ICU Vital Signs Last 24 Hrs  T(C): 36.9 (08 Sep 2019 00:09), Max: 36.9 (08 Sep 2019 00:09)  T(F): 98.4 (08 Sep 2019 00:09), Max: 98.4 (08 Sep 2019 00:09)  HR: 76 (08 Sep 2019 00:09) (68 - 96)  BP: 99/61 (08 Sep 2019 00:09) (99/61 - 145/72)  BP(mean): --  ABP: --  ABP(mean): --  RR: 18 (08 Sep 2019 00:09) (16 - 18)  SpO2: 97% (08 Sep 2019 00:09) (95% - 98%)      NEURO  Exam: A/Ox4, GCS 15  acetaminophen   Tablet .. 650 milliGRAM(s) Oral every 6 hours  OLANZapine 5 milliGRAM(s) Oral <User Schedule>  oxyCODONE    IR 5 milliGRAM(s) Oral every 6 hours PRN Severe Pain (7 - 10)  sertraline 200 milliGRAM(s) Oral daily    RESPIRATORY  Mechanical Ventilation: none    Exam: breathing comfortably on room air, equal chest rise    CARDIOVASCULAR    Exam: RRR  Cardiac Rhythm: sinus      GI/NUTRITION  Exam: Soft, NT ND  Diet: Regular  pantoprazole    Tablet 40 milliGRAM(s) Oral before breakfast      GENITOURINARY/RENAL  calcium carbonate 1250 mG  + Vitamin D (OsCal 500 + D) 1 Tablet(s) Oral three times a day  phytonadione   Solution 5 milliGRAM(s) Oral once      09-07 @ 07:01  -  09-08 @ 02:42  --------------------------------------------------------  IN:    Oral Fluid: 200 mL  Total IN: 200 mL    OUT:    Voided: 650 mL  Total OUT: 650 mL    Total NET: -450 mL        Weight (kg): 70.3 (09-07 @ 06:53)  09-07    140  |  100  |  17  ----------------------------<  125<H>  3.6   |  26  |  1.34<H>    Ca    9.4      07 Sep 2019 07:42    TPro  7.2  /  Alb  4.3  /  TBili  0.3  /  DBili  x   /  AST  28  /  ALT  33  /  AlkPhos  61  09-07    [ ] Bond catheter, indication: urine output monitoring in critically ill patient    HEMATOLOGIC  [ ] VTE Prophylaxis:                          10.6   4.82  )-----------( 163      ( 08 Sep 2019 00:55 )             32.6     PT/INR - ( 07 Sep 2019 23:37 )   PT: 43.9 sec;   INR: 3.70 ratio         PTT - ( 07 Sep 2019 23:37 )  PTT:39.7 sec  Transfusion: [ ] PRBC	[ ] Platelets	[ ] FFP	[ ] Cryoprecipitate      INFECTIOUS DISEASES  influenza   Vaccine 0.5 milliLiter(s) IntraMuscular once    RECENT CULTURES:      ENDOCRINE  levothyroxine 75 MICROGram(s) Oral daily    CAPILLARY BLOOD GLUCOSE          PATIENT CARE ACCESS DEVICES:  [ ] Peripheral IV  [ ] Central Venous Line	[ ] R	[ ] L	[ ] IJ	[ ] Fem	[ ] SC	Placed:   [ ] Arterial Line		[ ] R	[ ] L	[ ] Fem	[ ] Rad	[ ] Ax	Placed:   [ ] PICC:					[ ] Mediport  [ ] Urinary Catheter, Date Placed:   [x] Necessity of urinary, arterial, and venous catheters discussed    OTHER MEDICATIONS: lidocaine   Patch 1 Patch Transdermal every 24 hours      IMAGING STUDIES:

## 2019-09-09 DIAGNOSIS — S32.9XXA FRACTURE OF UNSPECIFIED PARTS OF LUMBOSACRAL SPINE AND PELVIS, INITIAL ENCOUNTER FOR CLOSED FRACTURE: ICD-10-CM

## 2019-09-09 DIAGNOSIS — S22.39XA FRACTURE OF ONE RIB, UNSPECIFIED SIDE, INITIAL ENCOUNTER FOR CLOSED FRACTURE: ICD-10-CM

## 2019-09-09 DIAGNOSIS — R79.1 ABNORMAL COAGULATION PROFILE: ICD-10-CM

## 2019-09-09 DIAGNOSIS — I48.91 UNSPECIFIED ATRIAL FIBRILLATION: ICD-10-CM

## 2019-09-09 DIAGNOSIS — Z79.899 OTHER LONG TERM (CURRENT) DRUG THERAPY: ICD-10-CM

## 2019-09-09 DIAGNOSIS — M81.0 AGE-RELATED OSTEOPOROSIS WITHOUT CURRENT PATHOLOGICAL FRACTURE: ICD-10-CM
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
## 2019-09-09 DIAGNOSIS — F32.9 MAJOR DEPRESSIVE DISORDER, SINGLE EPISODE, UNSPECIFIED: ICD-10-CM

## 2019-09-09 DIAGNOSIS — E03.9 HYPOTHYROIDISM, UNSPECIFIED: ICD-10-CM

## 2019-09-09 DIAGNOSIS — N17.9 ACUTE KIDNEY FAILURE, UNSPECIFIED: ICD-10-CM

## 2019-09-09 DIAGNOSIS — Z29.9 ENCOUNTER FOR PROPHYLACTIC MEASURES, UNSPECIFIED: ICD-10-CM

## 2019-09-09 LAB
ANION GAP SERPL CALC-SCNC: 11 MMOL/L — SIGNIFICANT CHANGE UP (ref 5–17)
APTT BLD: 24.8 SEC — LOW (ref 27.5–36.3)
BUN SERPL-MCNC: 22 MG/DL — SIGNIFICANT CHANGE UP (ref 7–23)
CALCIUM SERPL-MCNC: 8.2 MG/DL — LOW (ref 8.4–10.5)
CHLORIDE SERPL-SCNC: 103 MMOL/L — SIGNIFICANT CHANGE UP (ref 96–108)
CO2 SERPL-SCNC: 21 MMOL/L — LOW (ref 22–31)
CREAT SERPL-MCNC: 1.04 MG/DL — SIGNIFICANT CHANGE UP (ref 0.5–1.3)
GLUCOSE SERPL-MCNC: 127 MG/DL — HIGH (ref 70–99)
HCT VFR BLD CALC: 30.6 % — LOW (ref 34.5–45)
HGB BLD-MCNC: 10.2 G/DL — LOW (ref 11.5–15.5)
INR BLD: 1.17 RATIO — HIGH (ref 0.88–1.16)
MAGNESIUM SERPL-MCNC: 2.2 MG/DL — SIGNIFICANT CHANGE UP (ref 1.6–2.6)
MCHC RBC-ENTMCNC: 30.1 PG — SIGNIFICANT CHANGE UP (ref 27–34)
MCHC RBC-ENTMCNC: 33.5 GM/DL — SIGNIFICANT CHANGE UP (ref 32–36)
MCV RBC AUTO: 90 FL — SIGNIFICANT CHANGE UP (ref 80–100)
PHOSPHATE SERPL-MCNC: 2.8 MG/DL — SIGNIFICANT CHANGE UP (ref 2.5–4.5)
PLATELET # BLD AUTO: 131 K/UL — LOW (ref 150–400)
POTASSIUM SERPL-MCNC: 3.8 MMOL/L — SIGNIFICANT CHANGE UP (ref 3.5–5.3)
POTASSIUM SERPL-SCNC: 3.8 MMOL/L — SIGNIFICANT CHANGE UP (ref 3.5–5.3)
PROTHROM AB SERPL-ACNC: 13.5 SEC — HIGH (ref 10–12.9)
RBC # BLD: 3.41 M/UL — LOW (ref 3.8–5.2)
RBC # FLD: 14 % — SIGNIFICANT CHANGE UP (ref 10.3–14.5)
SODIUM SERPL-SCNC: 135 MMOL/L — SIGNIFICANT CHANGE UP (ref 135–145)
WBC # BLD: 5.1 K/UL — SIGNIFICANT CHANGE UP (ref 3.8–10.5)
WBC # FLD AUTO: 5.1 K/UL — SIGNIFICANT CHANGE UP (ref 3.8–10.5)

## 2019-09-09 PROCEDURE — 73110 X-RAY EXAM OF WRIST: CPT | Mod: 26,RT

## 2019-09-09 PROCEDURE — 73070 X-RAY EXAM OF ELBOW: CPT | Mod: 26,RT

## 2019-09-09 PROCEDURE — 73090 X-RAY EXAM OF FOREARM: CPT | Mod: 26,LT

## 2019-09-09 PROCEDURE — 71045 X-RAY EXAM CHEST 1 VIEW: CPT | Mod: 26

## 2019-09-09 PROCEDURE — 99223 1ST HOSP IP/OBS HIGH 75: CPT

## 2019-09-09 RX ORDER — METOPROLOL TARTRATE 50 MG
25 TABLET ORAL DAILY
Refills: 0 | Status: DISCONTINUED | OUTPATIENT
Start: 2019-09-09 | End: 2019-09-12

## 2019-09-09 RX ORDER — POTASSIUM CHLORIDE 20 MEQ
20 PACKET (EA) ORAL ONCE
Refills: 0 | Status: COMPLETED | OUTPATIENT
Start: 2019-09-09 | End: 2019-09-09

## 2019-09-09 RX ORDER — SERTRALINE 25 MG/1
100 TABLET, FILM COATED ORAL DAILY
Refills: 0 | Status: DISCONTINUED | OUTPATIENT
Start: 2019-09-09 | End: 2019-09-13

## 2019-09-09 RX ORDER — CHOLECALCIFEROL (VITAMIN D3) 125 MCG
1000 CAPSULE ORAL DAILY
Refills: 0 | Status: DISCONTINUED | OUTPATIENT
Start: 2019-09-09 | End: 2019-09-13

## 2019-09-09 RX ADMIN — SERTRALINE 200 MILLIGRAM(S): 25 TABLET, FILM COATED ORAL at 13:56

## 2019-09-09 RX ADMIN — Medication 975 MILLIGRAM(S): at 17:23

## 2019-09-09 RX ADMIN — LIDOCAINE 1 PATCH: 4 CREAM TOPICAL at 18:13

## 2019-09-09 RX ADMIN — LIDOCAINE 1 PATCH: 4 CREAM TOPICAL at 06:09

## 2019-09-09 RX ADMIN — OXYCODONE HYDROCHLORIDE 2.5 MILLIGRAM(S): 5 TABLET ORAL at 04:49

## 2019-09-09 RX ADMIN — ENOXAPARIN SODIUM 40 MILLIGRAM(S): 100 INJECTION SUBCUTANEOUS at 09:37

## 2019-09-09 RX ADMIN — Medication 975 MILLIGRAM(S): at 17:53

## 2019-09-09 RX ADMIN — OXYCODONE HYDROCHLORIDE 5 MILLIGRAM(S): 5 TABLET ORAL at 00:32

## 2019-09-09 RX ADMIN — SENNA PLUS 2 TABLET(S): 8.6 TABLET ORAL at 22:08

## 2019-09-09 RX ADMIN — Medication 975 MILLIGRAM(S): at 23:38

## 2019-09-09 RX ADMIN — Medication 100 MILLIGRAM(S): at 06:17

## 2019-09-09 RX ADMIN — Medication 1 TABLET(S): at 13:55

## 2019-09-09 RX ADMIN — Medication 1 TABLET(S): at 06:17

## 2019-09-09 RX ADMIN — Medication 975 MILLIGRAM(S): at 13:17

## 2019-09-09 RX ADMIN — OXYCODONE HYDROCHLORIDE 2.5 MILLIGRAM(S): 5 TABLET ORAL at 22:45

## 2019-09-09 RX ADMIN — Medication 75 MICROGRAM(S): at 06:17

## 2019-09-09 RX ADMIN — OLANZAPINE 5 MILLIGRAM(S): 15 TABLET, FILM COATED ORAL at 23:37

## 2019-09-09 RX ADMIN — Medication 975 MILLIGRAM(S): at 13:47

## 2019-09-09 RX ADMIN — Medication 100 MILLIGRAM(S): at 13:17

## 2019-09-09 RX ADMIN — OXYCODONE HYDROCHLORIDE 5 MILLIGRAM(S): 5 TABLET ORAL at 01:02

## 2019-09-09 RX ADMIN — Medication 250 MILLIMOLE(S): at 04:15

## 2019-09-09 RX ADMIN — PANTOPRAZOLE SODIUM 40 MILLIGRAM(S): 20 TABLET, DELAYED RELEASE ORAL at 06:17

## 2019-09-09 RX ADMIN — Medication 975 MILLIGRAM(S): at 23:44

## 2019-09-09 RX ADMIN — Medication 20 MILLIEQUIVALENT(S): at 04:15

## 2019-09-09 RX ADMIN — Medication 100 MILLIGRAM(S): at 22:08

## 2019-09-09 RX ADMIN — LIDOCAINE 1 PATCH: 4 CREAM TOPICAL at 17:23

## 2019-09-09 RX ADMIN — OXYCODONE HYDROCHLORIDE 2.5 MILLIGRAM(S): 5 TABLET ORAL at 04:19

## 2019-09-09 RX ADMIN — Medication 975 MILLIGRAM(S): at 06:16

## 2019-09-09 RX ADMIN — OXYCODONE HYDROCHLORIDE 2.5 MILLIGRAM(S): 5 TABLET ORAL at 22:15

## 2019-09-09 RX ADMIN — Medication 1 TABLET(S): at 23:39

## 2019-09-09 NOTE — PROGRESS NOTE ADULT - SUBJECTIVE AND OBJECTIVE BOX
SUNIL  GINGER NORIEGA is a  84 year old female, on coumadin for atrial fibrillation, lives in assisted living, ambulate with walker/cane presents after a ground level fall today at 6 am. She has been having diarrhea as she was started on antibiotics for her UTI. She did not lose consciousness. She states she felt dizzy and fell but recalls the whole event. She was trying to use the bathroom when she fell.She did not hit her head. She c/o R hip pain and inability to ambulate after the mechanical fall this morning.  She has [employer] pacemaker. Denies HS/LOC. Denies numbness/tingling. Denies fever/chills. Denies pain/injury elsewhere.     Initially admitted to floor but found to have persistently elevated INR with acute drop in H/H from 13.1/42.1 to 10.6/32.6. SICU consulted for hemorrhage watch and INR reversal.      24 HOUR EVENTS:  - UA that was sent s/p fall was negative  - IV locked  - HCT has been stable 30.1 > 30.3 > 29.8 > 30.6    SUBJECTIVE/ROS:  [X] A ten-point review of systems was otherwise negative except as noted.  [ ] Due to altered mental status/intubation, subjective information were not able to be obtained from the patient. History was obtained, to the extent possible, from review of the chart and collateral sources of information.      NEURO  RASS:     GCS:     CAM ICU:  Exam: awake, alert, oriented  Meds: acetaminophen   Tablet .. 975 milliGRAM(s) Oral every 6 hours  OLANZapine 5 milliGRAM(s) Oral <User Schedule>  oxyCODONE    IR 5 milliGRAM(s) Oral every 6 hours PRN Severe Pain (7 - 10)  oxyCODONE    IR 2.5 milliGRAM(s) Oral every 4 hours PRN Moderate Pain (4 - 6)  sertraline 200 milliGRAM(s) Oral daily    [x] Adequacy of sedation and pain control has been assessed and adjusted      RESPIRATORY  RR: 15 (09-09-19 @ 01:00) (12 - 36)  SpO2: 99% (09-09-19 @ 01:00) (94% - 100%)  Wt(kg): --  Exam: unlabored, clear to auscultation bilaterally  Mechanical Ventilation:     [N/A] Extubation Readiness Assessed  Meds:       CARDIOVASCULAR  HR: 61 (09-09-19 @ 01:00) (60 - 70)  BP: 117/57 (09-09-19 @ 01:00) (80/51 - 118/58)  BP(mean): 82 (09-09-19 @ 01:00) (61 - 586)  ABP: --  ABP(mean): --  Wt(kg): --  CVP(cm H2O): --  VBG - ( 08 Sep 2019 03:43 )  pH: 7.43  /  pCO2: 35    /  pO2: 46    / HCO3: 23    / Base Excess: -0.6  /  SaO2: 79     Lactate: 0.9                Exam: regular rate and rhythm  Cardiac Rhythm: sinus  Perfusion     [x]Adequate   [ ]Inadequate  Mentation   [x]Normal       [ ]Reduced  Extremities  [x]Warm         [ ]Cool  Volume Status [ ]Hypervolemic [x]Euvolemic [ ]Hypovolemic  Meds:       GI/NUTRITION  Exam: soft, nontender, nondistended, incision C/D/I  Diet:  Meds: docusate sodium 100 milliGRAM(s) Oral three times a day  pantoprazole    Tablet 40 milliGRAM(s) Oral before breakfast  senna 2 Tablet(s) Oral at bedtime      GENITOURINARY  I&O's Detail    09-07 @ 07:01  -  09-08 @ 07:00  --------------------------------------------------------  IN:    Oral Fluid: 440 mL    Sodium Chloride 0.9% IV Bolus: 500 mL  Total IN: 940 mL    OUT:    Voided: 650 mL  Total OUT: 650 mL    Total NET: 290 mL      09-08 @ 07:01  -  09-09 @ 01:43  --------------------------------------------------------  IN:    Oral Fluid: 1250 mL    sodium chloride 0.9%: 600 mL  Total IN: 1850 mL    OUT:    Voided: 600 mL  Total OUT: 600 mL    Total NET: 1250 mL          09-09    135  |  103  |  22  ----------------------------<  127<H>  3.8   |  21<L>  |  1.04    Ca    8.2<L>      09 Sep 2019 01:08  Phos  2.8     09-09  Mg     2.2     09-09    TPro  7.2  /  Alb  4.3  /  TBili  0.3  /  DBili  x   /  AST  28  /  ALT  33  /  AlkPhos  61  09-07    [ ] Bond catheter, indication: N/A  Meds: calcium carbonate 1250 mG  + Vitamin D (OsCal 500 + D) 1 Tablet(s) Oral three times a day        HEMATOLOGIC  Meds: enoxaparin Injectable 40 milliGRAM(s) SubCutaneous every 24 hours    [x] VTE Prophylaxis                        10.2   5.1   )-----------( 131      ( 09 Sep 2019 01:08 )             30.6     PT/INR - ( 09 Sep 2019 01:08 )   PT: 13.5 sec;   INR: 1.17 ratio         PTT - ( 09 Sep 2019 01:08 )  PTT:24.8 sec  Transfusion     [ ] PRBC   [ ] Platelets   [ ] FFP   [ ] Cryoprecipitate      INFECTIOUS DISEASES  WBC Count: 5.1 K/uL (09-09 @ 01:08)  WBC Count: 4.7 K/uL (09-08 @ 16:28)  WBC Count: 3.9 K/uL (09-08 @ 11:53)  WBC Count: 4.6 K/uL (09-08 @ 08:14)  WBC Count: 5.5 K/uL (09-08 @ 03:53)    RECENT CULTURES:    Meds: influenza   Vaccine 0.5 milliLiter(s) IntraMuscular once        ENDOCRINE  CAPILLARY BLOOD GLUCOSE        Meds: levothyroxine 75 MICROGram(s) Oral daily        ACCESS DEVICES:  [ ] Peripheral IV  [ ] Central Venous Line	[ ] R	[ ] L	[ ] IJ	[ ] Fem	[ ] SC	Placed:   [ ] Arterial Line		[ ] R	[ ] L	[ ] Fem	[ ] Rad	[ ] Ax	Placed:   [ ] PICC:					[ ] Mediport  [ ] Urinary Catheter, Date Placed:   [x] Necessity of urinary, arterial, and venous catheters discussed    OTHER MEDICATIONS:  chlorhexidine 2% Cloths 1 Application(s) Topical daily  lidocaine   Patch 1 Patch Transdermal every 24 hours      CODE STATUS:      IMAGING:    Assessment and Recommendation:   · Assessment		  ASSESSMENT:  GINGER NORIEGA is a 84y Female with a history of Afib on Coumadin, hypothyroid, Takotsubo cardiomyopathy, pacemaker, who presents after a fall found to have right 12th rib fracture and right superior pubic rami fracture with adjacent hematoma. SICU consulted for hemorrhage watch and INR reversal given acute drop in H/H and persistently elevated INR.    PLAN:    NEURO:  - Multimodal pain control with acetaminophen, oxycodone, lidocaine patch  - Sertraline 200 mg daily and olanzapine 5 mg three times weekly    RESPIRATORY:   - Incentive spirometry  - Monitor O2 saturation    CARDIOVASCULAR:  - Currently in sinus rhythm  - Continue to monitor hemodynamics    GI/NUTRITION:  - Regular diet  - Pantoprazole 40 mg daily    GENITOURINARY/RENAL:  - Monitor urine output  - Trend Cr  - Calcium carbonate and vitamin D    HEMATOLOGIC:  - s/p phytonadione 5 mg for INR reversal   - Kcentra for rapid reversal  - Hemorrhage watch q1 CBC and lactate x3  - lovenox for DVT ppx    INFECTIOUS DISEASE: UA negative   - No active issues  - Trend WBC and monitor for fevers    ENDOCRINE:  - Home levothyroxine 75 mg daily  - Check glucose on BMPs    MSK:  s/p mechanical fall  - XR R elbow, wrist, forearm ordered SUNIL  GINGER NORIEGA is a  84 year old female, on coumadin for atrial fibrillation, lives in assisted living, ambulate with walker/cane presents after a ground level fall today at 6 am. She has been having diarrhea as she was started on antibiotics for her UTI. She did not lose consciousness. She states she felt dizzy and fell but recalls the whole event. She was trying to use the bathroom when she fell.She did not hit her head. She c/o R hip pain and inability to ambulate after the mechanical fall this morning.  She has [employer] pacemaker. Denies HS/LOC. Denies numbness/tingling. Denies fever/chills. Denies pain/injury elsewhere.     Initially admitted to floor but found to have persistently elevated INR with acute drop in H/H from 13.1/42.1 to 10.6/32.6. SICU consulted for hemorrhage watch and INR reversal.      24 HOUR EVENTS:  - UA that was sent s/p fall was negative  - IV locked  - HCT has been stable 30.1 > 30.3 > 29.8 > 30.6    SUBJECTIVE/ROS:  [X] A ten-point review of systems was otherwise negative except as noted.  [ ] Due to altered mental status/intubation, subjective information were not able to be obtained from the patient. History was obtained, to the extent possible, from review of the chart and collateral sources of information.      NEURO  Exam: awake, alert, oriented  Meds: acetaminophen   Tablet .. 975 milliGRAM(s) Oral every 6 hours  OLANZapine 5 milliGRAM(s) Oral <User Schedule>  oxyCODONE    IR 5 milliGRAM(s) Oral every 6 hours PRN Severe Pain (7 - 10)  oxyCODONE    IR 2.5 milliGRAM(s) Oral every 4 hours PRN Moderate Pain (4 - 6)  sertraline 200 milliGRAM(s) Oral daily    [x] Adequacy of sedation and pain control has been assessed and adjusted      RESPIRATORY  RR: 15 (09-09-19 @ 01:00) (12 - 36)  SpO2: 99% (09-09-19 @ 01:00) (94% - 100%)  Wt(kg): --  Exam: unlabored, clear to auscultation bilaterally    [N/A] Extubation Readiness Assessed  Meds:       CARDIOVASCULAR  HR: 61 (09-09-19 @ 01:00) (60 - 70)  BP: 117/57 (09-09-19 @ 01:00) (80/51 - 118/58)  BP(mean): 82 (09-09-19 @ 01:00) (61 - 586)  ABP: --  ABP(mean): --  Wt(kg): --  CVP(cm H2O): --  VBG - ( 08 Sep 2019 03:43 )  pH: 7.43  /  pCO2: 35    /  pO2: 46    / HCO3: 23    / Base Excess: -0.6  /  SaO2: 79     Lactate: 0.9          Exam: regular rate and rhythm  Cardiac Rhythm: sinus  Perfusion     [x]Adequate   [ ]Inadequate  Mentation   [x]Normal       [ ]Reduced  Extremities  [x]Warm         [ ]Cool  Volume Status [ ]Hypervolemic [x]Euvolemic [ ]Hypovolemic  Meds:       GI/NUTRITION  Exam: soft, nontender, nondistended  Diet:  Meds: docusate sodium 100 milliGRAM(s) Oral three times a day  pantoprazole    Tablet 40 milliGRAM(s) Oral before breakfast  senna 2 Tablet(s) Oral at bedtime      GENITOURINARY  I&O's Detail    09-07 @ 07:01  -  09-08 @ 07:00  --------------------------------------------------------  IN:    Oral Fluid: 440 mL    Sodium Chloride 0.9% IV Bolus: 500 mL  Total IN: 940 mL    OUT:    Voided: 650 mL  Total OUT: 650 mL    Total NET: 290 mL      09-08 @ 07:01  -  09-09 @ 01:43  --------------------------------------------------------  IN:    Oral Fluid: 1250 mL    sodium chloride 0.9%: 600 mL  Total IN: 1850 mL    OUT:    Voided: 600 mL  Total OUT: 600 mL    Total NET: 1250 mL          09-09    135  |  103  |  22  ----------------------------<  127<H>  3.8   |  21<L>  |  1.04    Ca    8.2<L>      09 Sep 2019 01:08  Phos  2.8     09-09  Mg     2.2     09-09    TPro  7.2  /  Alb  4.3  /  TBili  0.3  /  DBili  x   /  AST  28  /  ALT  33  /  AlkPhos  61  09-07    [ ] Bond catheter, indication: N/A  Meds: calcium carbonate 1250 mG  + Vitamin D (OsCal 500 + D) 1 Tablet(s) Oral three times a day        HEMATOLOGIC  Meds: enoxaparin Injectable 40 milliGRAM(s) SubCutaneous every 24 hours    [x] VTE Prophylaxis                        10.2   5.1   )-----------( 131      ( 09 Sep 2019 01:08 )             30.6     PT/INR - ( 09 Sep 2019 01:08 )   PT: 13.5 sec;   INR: 1.17 ratio         PTT - ( 09 Sep 2019 01:08 )  PTT:24.8 sec  Transfusion     [ ] PRBC   [ ] Platelets   [ ] FFP   [ ] Cryoprecipitate      INFECTIOUS DISEASES  WBC Count: 5.1 K/uL (09-09 @ 01:08)  WBC Count: 4.7 K/uL (09-08 @ 16:28)  WBC Count: 3.9 K/uL (09-08 @ 11:53)  WBC Count: 4.6 K/uL (09-08 @ 08:14)  WBC Count: 5.5 K/uL (09-08 @ 03:53)    RECENT CULTURES:    Meds: influenza   Vaccine 0.5 milliLiter(s) IntraMuscular once        ENDOCRINE  CAPILLARY BLOOD GLUCOSE        Meds: levothyroxine 75 MICROGram(s) Oral daily        ACCESS DEVICES:  [ ] Peripheral IV  [ ] Central Venous Line	[ ] R	[ ] L	[ ] IJ	[ ] Fem	[ ] SC	Placed:   [ ] Arterial Line		[ ] R	[ ] L	[ ] Fem	[ ] Rad	[ ] Ax	Placed:   [ ] PICC:					[ ] Mediport  [ ] Urinary Catheter, Date Placed:   [x] Necessity of urinary, arterial, and venous catheters discussed    OTHER MEDICATIONS:  chlorhexidine 2% Cloths 1 Application(s) Topical daily  lidocaine   Patch 1 Patch Transdermal every 24 hours      CODE STATUS:      IMAGING:    Assessment and Recommendation:   · Assessment		  ASSESSMENT:  GINGER NORIEGA is a 84y Female with a history of Afib on Coumadin, hypothyroid, Takotsubo cardiomyopathy, pacemaker, who presents after a fall found to have right 12th rib fracture and right superior pubic rami fracture with adjacent hematoma. SICU consulted for hemorrhage watch and INR reversal given acute drop in H/H and persistently elevated INR.    PLAN:    NEURO:  - Multimodal pain control with acetaminophen, oxycodone, lidocaine patch  - Sertraline 200 mg daily and olanzapine 5 mg three times weekly    RESPIRATORY:   - Incentive spirometry  - Monitor O2 saturation    CARDIOVASCULAR:  - Currently in sinus rhythm  - Continue to monitor hemodynamics    GI/NUTRITION:  - Regular diet  - Pantoprazole 40 mg daily    GENITOURINARY/RENAL:  - Monitor urine output  - Trend Cr  - Calcium carbonate and vitamin D    HEMATOLOGIC:  - s/p phytonadione 5 mg for INR reversal   - Kcentra for rapid reversal  - Hemorrhage watch q1 CBC and lactate x3  - lovenox for DVT ppx    INFECTIOUS DISEASE: UA negative   - No active issues  - Trend WBC and monitor for fevers    ENDOCRINE:  - Home levothyroxine 75 mg daily  - Check glucose on BMPs    MSK:  s/p mechanical fall  - XR R elbow, wrist, forearm ordered

## 2019-09-09 NOTE — CONSULT NOTE ADULT - SUBJECTIVE AND OBJECTIVE BOX
HPI:  84 year old female, on coumadin for atrial fibrillation, PPM, hearing loss, depression, Takotsubo cardiomyopathy, hypothyroid, osteoporosis lives in assisted living, ambulates with walker/cane presents after a ground level fall . She has been having diarrhea as she was started on antibiotics for her UTI. She did not lose consciousness. She states she felt dizzy and fell but recalls the whole event. She was trying to use the bathroom when she fell.She did not hit her head. She c/o R hip pain and inability to ambulate after the mechanical fall this morning. Initially admitted to floor but found to have persistently elevated INR with acute drop in H/H from 13.1/42.1 to 10.6/32.6. SICU consulted for hemorrhage watch and INR reversal. Now stable on the floor          PAST MEDICAL & SURGICAL HISTORY:  Hearing impairment  Intracranial hypotension  Depression  HLD (hyperlipidemia)  Takotsubo cardiomyopathy  Atrial fibrillation  Osteoporosis  Hypothyroid  Elective surgery: lymph node surgery  Cardiac pacemaker  S/P appendectomy  S/P tonsillectomy      FAMILY HISTORY:      SOCIAL HISTORY: not smoker, does not drink alcohol     MEDICATIONS:    Home Medications:  synthroid 75 mcg  prolia 60 mg into skin every 6 months   warfarin 7.5 mg daily + 1/2 of the 7.5 tablet in addition to the 7.5 twice weekly (Monday and Thursday)   metoprolol 25 mg as needed   protonix 40 mg   ranitidine 300 mg night   zoloft 100 mgx2 daily   aprazolam 0.25 mg x 2 daily   olanzapine 5 mg bedtime 3x week    MEDICATIONS  (PRN):    Allergies    bacitracin (Unknown)  penicillin (Unknown)    Intolerances        Vital Signs Last 24 Hrs  T(C): 36.8 (07 Sep 2019 06:53), Max: 36.8 (07 Sep 2019 06:53)  T(F): 98.3 (07 Sep 2019 06:53), Max: 98.3 (07 Sep 2019 06:53)  HR: 96 (07 Sep 2019 06:53) (96 - 96)  BP: 132/62 (07 Sep 2019 06:53) (132/62 - 132/62)  BP(mean): --  RR: 16 (07 Sep 2019 06:53) (16 - 16)  SpO2: 98% (07 Sep 2019 06:53) (98% - 98%)  Daily Height in cm: 165.1 (07 Sep 2019 06:53)                  PAST MEDICAL & SURGICAL HISTORY:  Hearing impairment  Intracranial hypotension  Depression  HLD (hyperlipidemia)  Takotsubo cardiomyopathy  Atrial fibrillation  Osteoporosis  Hypothyroid  Elective surgery: lymph node surgery  Cardiac pacemaker  S/P appendectomy  S/P tonsillectomy      Review of Systems:   CONSTITUTIONAL: No fever  EYES: No eye pain  ENMT:  hearing difficulty left ear  NECK: No pain   RESPIRATORY:  No shortness of breath  CARDIOVASCULAR: No chest pain  GASTROINTESTINAL: No abdominal or epigastric pain.  GENITOURINARY: No dysuria, frequency, hematuria, or incontinence  NEUROLOGICAL: No headaches  SKIN: No itching  ENDOCRINE: No heat or cold intolerance; No hair loss  MUSCULOSKELETAL: Right axillary pain   PSYCHIATRIC: No depression  ALLERY AND IMMUNOLOGIC: No hives or eczema    Allergies    bacitracin (Unknown)  penicillin (Unknown)    Intolerances        Social History: denies smoking, etoh use    FAMILY HISTORY: non pertinent       MEDICATIONS  (STANDING):  acetaminophen   Tablet .. 975 milliGRAM(s) Oral every 6 hours  calcium carbonate 1250 mG  + Vitamin D (OsCal 500 + D) 1 Tablet(s) Oral three times a day  docusate sodium 100 milliGRAM(s) Oral three times a day  enoxaparin Injectable 40 milliGRAM(s) SubCutaneous every 24 hours  influenza   Vaccine 0.5 milliLiter(s) IntraMuscular once  levothyroxine 75 MICROGram(s) Oral daily  lidocaine   Patch 1 Patch Transdermal every 24 hours  OLANZapine 5 milliGRAM(s) Oral <User Schedule>  pantoprazole    Tablet 40 milliGRAM(s) Oral before breakfast  senna 2 Tablet(s) Oral at bedtime  sertraline 200 milliGRAM(s) Oral daily    MEDICATIONS  (PRN):  oxyCODONE    IR 5 milliGRAM(s) Oral every 6 hours PRN Severe Pain (7 - 10)  oxyCODONE    IR 2.5 milliGRAM(s) Oral every 4 hours PRN Moderate Pain (4 - 6)        CAPILLARY BLOOD GLUCOSE        I&O's Summary    08 Sep 2019 07:01  -  09 Sep 2019 07:00  --------------------------------------------------------  IN: 2237.5 mL / OUT: 1150 mL / NET: 1087.5 mL        PHYSICAL EXAM:  GENERAL: NAD, well-developed  HEAD:  Atraumatic, Normocephalic  EYES: EOMI, PERRLA, conjunctiva and sclera clear  NECK: Supple, No JVD  CHEST/LUNG: Clear to auscultation bilaterally; No wheeze  HEART: Regular rate and rhythm; No murmurs, rubs, or gallops  ABDOMEN: Soft, Nontender, Nondistended; Bowel sounds present  EXTREMITIES:  2+ Peripheral Pulses, No clubbing, cyanosis, or edema  PSYCH: AAOx3  NEUROLOGY: non-focal  SKIN: No rashes or lesions    LABS:                        10.2   5.1   )-----------( 131      ( 09 Sep 2019 01:08 )             30.6     -    135  |  103  |  22  ----------------------------<  127<H>  3.8   |  21<L>  |  1.04    Ca    8.2<L>      09 Sep 2019 01:08  Phos  2.8       Mg     2.2     -      PT/INR - ( 09 Sep 2019 01:08 )   PT: 13.5 sec;   INR: 1.17 ratio         PTT - ( 09 Sep 2019 01:08 )  PTT:24.8 sec      Urinalysis Basic - ( 08 Sep 2019 17:30 )    Color: Light Yellow / Appearance: Clear / S.014 / pH: x  Gluc: x / Ketone: Negative  / Bili: Negative / Urobili: Negative   Blood: x / Protein: Negative / Nitrite: Negative   Leuk Esterase: Negative / RBC: x / WBC x   Sq Epi: x / Non Sq Epi: x / Bacteria: x        RADIOLOGY & ADDITIONAL TESTS:    Imaging Personally Reviewed:    Consultant(s) Notes Reviewed:  sx    Care Discussed with Consultants/Other Providers: sx

## 2019-09-09 NOTE — CONSULT NOTE ADULT - CONSULT REASON
Medical management
right pubic ramus fractures
trauma consult, s/p fall, with pelvic fracture
Hemorrhage watch

## 2019-09-09 NOTE — CONSULT NOTE ADULT - PROBLEM SELECTOR RECOMMENDATION 9
Pt found to have right 12th rib fracture s/p fall   Incentive spirometry  Pain control with tylenol, lidocaine patch, oxycodone prn   O2 supplementation prn   PT lovenox Prolia q6 months

## 2019-09-09 NOTE — CONSULT NOTE ADULT - PROBLEM/RECOMMENDATION-2
DISPLAY PLAN FREE TEXT Implemented All Universal Safety Interventions:  [place] to call system. Call bell, personal items and telephone within reach. Instruct patient to call for assistance. Room bathroom lighting operational. Non-slip footwear when patient is off stretcher. Physically safe environment: no spills, clutter or unnecessary equipment. Stretcher in lowest position, wheels locked, appropriate side rails in place.

## 2019-09-09 NOTE — CONSULT NOTE ADULT - ASSESSMENT
ASSESSMENT:  GINGER NORIEGA is a 84y Female with a history of Afib on Coumadin, hypothyroid, Takotsubo cardiomyopathy, pacemaker, who presents after a fall found to have right 12th rib fracture and right superior pubic rami fracture with adjacent hematoma. SICU consulted for hemorrhage watch and INR reversal given acute drop in H/H and persistently elevated INR.    PLAN:    NEURO:  - Multimodal pain control with acetaminophen, oxycodone, lidocaine patch  - Sertraline 200 mg daily and olanzapine 5 mg three times weekly    RESPIRATORY:   - Incentive spirometry  - Monitor O2 saturation    CARDIOVASCULAR:  - Currently in sinus rhythm  - Continue to monitor hemodynamics    GI/NUTRITION:  - Regular diet  - Pantoprazole 40 mg daily    GENITOURINARY/RENAL:  - Monitor urine output  - Trend Cr  - Calcium carbonate and vitamin D    HEMATOLOGIC:  - s/p phytonadione 5 mg for INR reversal   - Kcentra for rapid reversal  - Hemorrhage watch q1 CBC and lactate x3    INFECTIOUS DISEASE:  - No active issues  - Trend WBC and monitor for fevers    ENDOCRINE:  - Home levothyroxine 75 mg daily  - Check glucose on BMPs    NATALY Mullen, PGY-2  SICU [id number]
84 year old female, on coumadin for atrial fibrillation, PPM, hearing loss, depression, Takotsubo cardiomyopathy, hypothyroid, osteoporosis lives in assisted living, ambulates with walker/cane presents after a ground level fall found to have right 12th rib fracture and right superior pubic rami fracture with adjacent hematoma. SICU consulted for hemorrhage watch and INR reversal given acute drop in H/H and persistently elevated INR. Now stable on the floor
ASSESSMENT: This is an 84 year old female, on coumadin for atrial fibrillation, s/p fall, with pelvic fracture.     PLAN:    - CTA chest/abd/pelvis  - appreciate orthopedic surgery input (WBAT, repeat Pelvic XR's in 1 week)  - Patient seen/examined with attending.   - Plan discussed with Attending, Dr. Cabrera

## 2019-09-09 NOTE — CHART NOTE - NSCHARTNOTEFT_GEN_A_CORE
SICU Transfer Note    Transfer from: SICU  Transfer to: [facility], [address]   Accepting physican: Dr. Woo       SICU COURSE:  84F, on coumadin for a fib, lives in assisted living, ambulate with walker/cane presents after a ground level fall. She has been having diarrhea as she was started on antibiotics for her UTI. No LOC.  She c/o R hip pain and inability to ambulate after the mechanical fall.  She has [employer] pacemaker.     Found to have right 12th rib fracture and right superior pubic rami fracture with adjacent hematoma. SICU consulted for hemorrhage watch and INR reversal given acute drop in H/H and persistently elevated INR.    - UA that was sent s/p fall was negative  - IV locked  - HCT has been stable 30.1 > 30.3 > 29.8 > 30.6      ASSESSMENT & PLAN:   A/P: 83 y/o F with a history of Afib on Coumadin, hypothyroid, Takotsubo cardiomyopathy, pacemaker, who presents after a fall found to have right 12th rib fracture and right superior pubic rami fracture with adjacent hematoma. SICU consulted for hemorrhage watch and INR reversal given acute drop in H/H and persistently elevated INR.    -multimodal pain control  -regular diet  -dvt ppx  -f/u am cbc   -interrogate pacemaker     Vital Signs Last 24 Hrs  T(C): 36.6 (09 Sep 2019 13:33), Max: 37.1 (08 Sep 2019 15:00)  T(F): 97.9 (09 Sep 2019 13:33), Max: 98.8 (08 Sep 2019 15:00)  HR: 70 (09 Sep 2019 13:33) (55 - 70)  BP: 110/58 (09 Sep 2019 13:33) (87/51 - 129/61)  BP(mean): 76 (09 Sep 2019 06:00) (65 - 88)  RR: 15 (09 Sep 2019 07:40) (11 - 23)  SpO2: 97% (09 Sep 2019 13:33) (94% - 100%)  I&O's Summary    08 Sep 2019 07:01  -  09 Sep 2019 07:00  --------------------------------------------------------  IN: 2237.5 mL / OUT: 1150 mL / NET: 1087.5 mL      MEDICATIONS  (STANDING):  acetaminophen   Tablet .. 975 milliGRAM(s) Oral every 6 hours  calcium carbonate 1250 mG  + Vitamin D (OsCal 500 + D) 1 Tablet(s) Oral three times a day  docusate sodium 100 milliGRAM(s) Oral three times a day  enoxaparin Injectable 40 milliGRAM(s) SubCutaneous every 24 hours  influenza   Vaccine 0.5 milliLiter(s) IntraMuscular once  levothyroxine 75 MICROGram(s) Oral daily  lidocaine   Patch 1 Patch Transdermal every 24 hours  OLANZapine 5 milliGRAM(s) Oral <User Schedule>  pantoprazole    Tablet 40 milliGRAM(s) Oral before breakfast  senna 2 Tablet(s) Oral at bedtime  sertraline 200 milliGRAM(s) Oral daily    MEDICATIONS  (PRN):  oxyCODONE    IR 5 milliGRAM(s) Oral every 6 hours PRN Severe Pain (7 - 10)  oxyCODONE    IR 2.5 milliGRAM(s) Oral every 4 hours PRN Moderate Pain (4 - 6)        LABS                                            10.2                  Neurophils% (auto):   x      (09-09 @ 01:08):    5.1  )-----------(131          Lymphocytes% (auto):  x                                             30.6                   Eosinphils% (auto):   x        Manual%: Neutrophils x    ; Lymphocytes x    ; Eosinophils x    ; Bands%: x    ; Blasts x                                    135    |  103    |  22                  Calcium: 8.2   / iCa: x      (09-09 @ 01:08)    ----------------------------<  127       Magnesium: 2.2                              3.8     |  21     |  1.04             Phosphorous: 2.8        ( 09-09 @ 01:08 )   PT: 13.5 sec;   INR: 1.17 ratio  aPTT: 24.8 sec

## 2019-09-10 ENCOUNTER — TRANSCRIPTION ENCOUNTER (OUTPATIENT)
Age: 84
End: 2019-09-10

## 2019-09-10 LAB
HCT VFR BLD CALC: 30.7 % — LOW (ref 34.5–45)
HGB BLD-MCNC: 10.1 G/DL — LOW (ref 11.5–15.5)
INR BLD: 1.04 RATIO — SIGNIFICANT CHANGE UP (ref 0.88–1.16)
MCHC RBC-ENTMCNC: 29.5 PG — SIGNIFICANT CHANGE UP (ref 27–34)
MCHC RBC-ENTMCNC: 32.9 GM/DL — SIGNIFICANT CHANGE UP (ref 32–36)
MCV RBC AUTO: 89.7 FL — SIGNIFICANT CHANGE UP (ref 80–100)
PLATELET # BLD AUTO: 168 K/UL — SIGNIFICANT CHANGE UP (ref 150–400)
PROTHROM AB SERPL-ACNC: 12 SEC — SIGNIFICANT CHANGE UP (ref 10–12.9)
RBC # BLD: 3.43 M/UL — LOW (ref 3.8–5.2)
RBC # FLD: 13.8 % — SIGNIFICANT CHANGE UP (ref 10.3–14.5)
WBC # BLD: 5.5 K/UL — SIGNIFICANT CHANGE UP (ref 3.8–10.5)
WBC # FLD AUTO: 5.5 K/UL — SIGNIFICANT CHANGE UP (ref 3.8–10.5)

## 2019-09-10 PROCEDURE — 93280 PM DEVICE PROGR EVAL DUAL: CPT | Mod: 26

## 2019-09-10 PROCEDURE — 99233 SBSQ HOSP IP/OBS HIGH 50: CPT

## 2019-09-10 RX ORDER — OXYCODONE HYDROCHLORIDE 5 MG/1
10 TABLET ORAL EVERY 4 HOURS
Refills: 0 | Status: DISCONTINUED | OUTPATIENT
Start: 2019-09-10 | End: 2019-09-13

## 2019-09-10 RX ORDER — DOCUSATE SODIUM 100 MG
1 CAPSULE ORAL
Qty: 0 | Refills: 0 | DISCHARGE
Start: 2019-09-10

## 2019-09-10 RX ORDER — OXYCODONE HYDROCHLORIDE 5 MG/1
1 TABLET ORAL
Qty: 0 | Refills: 0 | DISCHARGE
Start: 2019-09-10

## 2019-09-10 RX ORDER — ALPRAZOLAM 0.25 MG
0.25 TABLET ORAL
Qty: 0 | Refills: 0 | DISCHARGE

## 2019-09-10 RX ORDER — CHOLECALCIFEROL (VITAMIN D3) 125 MCG
1000 CAPSULE ORAL
Qty: 0 | Refills: 0 | DISCHARGE
Start: 2019-09-10

## 2019-09-10 RX ORDER — ACETAMINOPHEN 500 MG
3 TABLET ORAL
Qty: 0 | Refills: 0 | DISCHARGE
Start: 2019-09-10

## 2019-09-10 RX ORDER — LANOLIN ALCOHOL/MO/W.PET/CERES
3 CREAM (GRAM) TOPICAL AT BEDTIME
Refills: 0 | Status: DISCONTINUED | OUTPATIENT
Start: 2019-09-10 | End: 2019-09-13

## 2019-09-10 RX ORDER — WARFARIN SODIUM 2.5 MG/1
7.5 TABLET ORAL ONCE
Refills: 0 | Status: COMPLETED | OUTPATIENT
Start: 2019-09-10 | End: 2019-09-10

## 2019-09-10 RX ORDER — SENNA PLUS 8.6 MG/1
2 TABLET ORAL
Qty: 0 | Refills: 0 | DISCHARGE
Start: 2019-09-10

## 2019-09-10 RX ORDER — OXYCODONE HYDROCHLORIDE 5 MG/1
5 TABLET ORAL EVERY 4 HOURS
Refills: 0 | Status: DISCONTINUED | OUTPATIENT
Start: 2019-09-10 | End: 2019-09-13

## 2019-09-10 RX ORDER — ALPRAZOLAM 0.25 MG
0.25 TABLET ORAL DAILY
Refills: 0 | Status: DISCONTINUED | OUTPATIENT
Start: 2019-09-10 | End: 2019-09-13

## 2019-09-10 RX ADMIN — ENOXAPARIN SODIUM 40 MILLIGRAM(S): 100 INJECTION SUBCUTANEOUS at 09:35

## 2019-09-10 RX ADMIN — OXYCODONE HYDROCHLORIDE 10 MILLIGRAM(S): 5 TABLET ORAL at 12:10

## 2019-09-10 RX ADMIN — Medication 1000 UNIT(S): at 11:40

## 2019-09-10 RX ADMIN — Medication 975 MILLIGRAM(S): at 17:34

## 2019-09-10 RX ADMIN — Medication 0.25 MILLIGRAM(S): at 15:02

## 2019-09-10 RX ADMIN — Medication 0.25 MILLIGRAM(S): at 23:54

## 2019-09-10 RX ADMIN — SENNA PLUS 2 TABLET(S): 8.6 TABLET ORAL at 21:30

## 2019-09-10 RX ADMIN — LIDOCAINE 1 PATCH: 4 CREAM TOPICAL at 05:52

## 2019-09-10 RX ADMIN — Medication 100 MILLIGRAM(S): at 06:24

## 2019-09-10 RX ADMIN — Medication 975 MILLIGRAM(S): at 06:24

## 2019-09-10 RX ADMIN — Medication 100 MILLIGRAM(S): at 15:02

## 2019-09-10 RX ADMIN — Medication 975 MILLIGRAM(S): at 16:00

## 2019-09-10 RX ADMIN — Medication 975 MILLIGRAM(S): at 12:10

## 2019-09-10 RX ADMIN — Medication 100 MILLIGRAM(S): at 21:30

## 2019-09-10 RX ADMIN — Medication 1 TABLET(S): at 15:02

## 2019-09-10 RX ADMIN — Medication 975 MILLIGRAM(S): at 06:28

## 2019-09-10 RX ADMIN — Medication 975 MILLIGRAM(S): at 11:41

## 2019-09-10 RX ADMIN — PANTOPRAZOLE SODIUM 40 MILLIGRAM(S): 20 TABLET, DELAYED RELEASE ORAL at 06:24

## 2019-09-10 RX ADMIN — LIDOCAINE 1 PATCH: 4 CREAM TOPICAL at 17:36

## 2019-09-10 RX ADMIN — Medication 1 TABLET(S): at 21:30

## 2019-09-10 RX ADMIN — Medication 75 MICROGRAM(S): at 06:24

## 2019-09-10 RX ADMIN — WARFARIN SODIUM 7.5 MILLIGRAM(S): 2.5 TABLET ORAL at 21:32

## 2019-09-10 RX ADMIN — LIDOCAINE 1 PATCH: 4 CREAM TOPICAL at 20:04

## 2019-09-10 RX ADMIN — Medication 975 MILLIGRAM(S): at 23:55

## 2019-09-10 RX ADMIN — SERTRALINE 100 MILLIGRAM(S): 25 TABLET, FILM COATED ORAL at 11:40

## 2019-09-10 RX ADMIN — OXYCODONE HYDROCHLORIDE 10 MILLIGRAM(S): 5 TABLET ORAL at 11:40

## 2019-09-10 NOTE — DISCHARGE NOTE PROVIDER - CARE PROVIDER_API CALL
Guero Arredondo (MD)  Orthopaedic Surgery  [address]  [place] [address]  Phone: [phone]  Fax: [phone]  Follow Up Time: 2 weeks Guero Arredondo (MD)  Orthopaedic Surgery  [address]  [place] [address]  Phone: [phone]  Fax: [phone]  Follow Up Time: 2 weeks    Stanislaw Thompson)  Surgery; Surgical Critical Care  [address] [place] [address]  Phone: [phone]  Fax: [phone]  Follow Up Time: Routine

## 2019-09-10 NOTE — PHYSICAL THERAPY INITIAL EVALUATION ADULT - PERTINENT HX OF CURRENT PROBLEM, REHAB EVAL
84F h/o Afib on Coumadin, hypothyroid, Takotsubo cardiomyopathy, pacemaker, who presents after a fall found to have right 12th rib fracture and right superior pubic rami fracture with adjacent hematoma.
84F h/o Afib on Coumadin, hypothyroid, Takotsubo cardiomyopathy, pacemaker, who presents after a mechanical fall resulting in pain in R hip and side. X-ray/CT: +right 12th rib fracture and right superior pubic rami fracture with adjacent hematoma. Forearm X-ray: neg.

## 2019-09-10 NOTE — PHYSICAL THERAPY INITIAL EVALUATION ADULT - DISCHARGE DISPOSITION, PT EVAL
rehabilitation facility/anticipated DC subacute rehab pending further ambulation Subacute Rehab/rehabilitation facility

## 2019-09-10 NOTE — DISCHARGE NOTE PROVIDER - CARE PROVIDERS DIRECT ADDRESSES
[email or web address]

## 2019-09-10 NOTE — PHYSICAL THERAPY INITIAL EVALUATION ADULT - BED MOBILITY TRAINING, PT EVAL
GOAL: Pt will perform all bedmobility independently within 3-4 wks. GOAL: Pt will perform all bed mobility independently within 3-4 wks.

## 2019-09-10 NOTE — PROCEDURE NOTE - ADDITIONAL PROCEDURE DETAILS
1. Battery longevity 5.5 years  2. Lead impedances WNL  3. Sensing and pacing thresholds WNL  4. No arrhythmia episodes stored to correlate with fall event  5. Normal pacemaker function  6. Follow up at [facility]     #[id number]

## 2019-09-10 NOTE — DISCHARGE NOTE PROVIDER - NSDCCPCAREPLAN_GEN_ALL_CORE_FT
PRINCIPAL DISCHARGE DIAGNOSIS  Diagnosis: Pelvic fracture  Assessment and Plan of Treatment: WBAT- Follow up josh Arredondo in 2 weeks      SECONDARY DISCHARGE DIAGNOSES  Diagnosis: Rib fracture  Assessment and Plan of Treatment: Pain control    Diagnosis: Hypothyroid  Assessment and Plan of Treatment: Hypothyroid    Diagnosis: Atrial fibrillation  Assessment and Plan of Treatment: Atrial fibrillation    Diagnosis: Osteoporosis  Assessment and Plan of Treatment: Osteoporosis PRINCIPAL DISCHARGE DIAGNOSIS  Diagnosis: Pelvic fracture  Assessment and Plan of Treatment: WBAT- Follow up josh Arredondo in 2 weeks      SECONDARY DISCHARGE DIAGNOSES  Diagnosis: Rib fracture  Assessment and Plan of Treatment: Pain control    Diagnosis: Hypothyroid  Assessment and Plan of Treatment: Hypothyroid    Diagnosis: Atrial fibrillation  Assessment and Plan of Treatment: Cont Coumadin and INR checks    Diagnosis: Osteoporosis  Assessment and Plan of Treatment: Osteoporosis PRINCIPAL DISCHARGE DIAGNOSIS  Diagnosis: Pelvic fracture  Assessment and Plan of Treatment: Weight bearing as tolerated  - Follow up w Dr Arredondo in 2 weeks please call to schedule an appointment      SECONDARY DISCHARGE DIAGNOSES  Diagnosis: Hypothyroid  Assessment and Plan of Treatment: Hypothyroid    Diagnosis: Atrial fibrillation  Assessment and Plan of Treatment: Cont Coumadin and INR checks  toprol xl 25mg daily    Diagnosis: Rib fracture  Assessment and Plan of Treatment: Pain control  encourage Incentive spirometry  Pain control with tylenol, lidocaine patch, oxycodone as needed   Please follow up with your regular medical doctor in 1 week, please call to schedule an appointment to discuss recent hospitalization, if needed you can follow up with trauma Dr. Thompson       Diagnosis: Osteoporosis  Assessment and Plan of Treatment: Osteoporosis

## 2019-09-10 NOTE — PROGRESS NOTE ADULT - ASSESSMENT
85 y/o F with a history of Afib on Coumadin, hypothyroid, Takotsubo cardiomyopathy, pacemaker, who presents after a fall found to have right 12th rib fracture and right superior pubic rami fracture with adjacent hematoma. SICU consulted for hemorrhage watch and INR reversal given acute drop in H/H and persistently elevated INR -> now on floor after H&H stable  x3.      -optimize pain meds   -interrogate pacemaker   -regular diet  -OOB/IS   -dvt ppx   -restart coumadin     ACS [id number] 83 y/o F with a history of Afib on Coumadin, hypothyroid, Takotsubo cardiomyopathy, pacemaker, who presents after a fall found to have right 12th rib fracture and right superior pubic rami fracture with adjacent hematoma. SICU consulted for hemorrhage watch and INR reversal given acute drop in H/H and persistently elevated INR -> H&H stable last 3 days     -optimize pain meds   -interrogate pacemaker   -regular diet  -OOB/IS   -dvt ppx   -restart coumadin     ACS [id number]

## 2019-09-10 NOTE — PHYSICAL THERAPY INITIAL EVALUATION ADULT - ACTIVE RANGE OF MOTION EXAMINATION, REHAB EVAL
bilateral upper extremity Active ROM was WFL (within functional limits)/Left LE Active ROM was WFL (within functional limits)/RLE limited 2/2 pain. Pt unable to SLR RLE.

## 2019-09-10 NOTE — PROGRESS NOTE ADULT - SUBJECTIVE AND OBJECTIVE BOX
Patient is a 84y old  Female who presents with a chief complaint of s/p fall (10 Sep 2019 12:25)      SUBJECTIVE / OVERNIGHT EVENTS: feels ok, pain over right hip is better, no cp, sob, chills    MEDICATIONS  (STANDING):  acetaminophen   Tablet .. 975 milliGRAM(s) Oral every 6 hours  calcium carbonate 1250 mG  + Vitamin D (OsCal 500 + D) 1 Tablet(s) Oral three times a day  cholecalciferol 1000 Unit(s) Oral daily  docusate sodium 100 milliGRAM(s) Oral three times a day  enoxaparin Injectable 40 milliGRAM(s) SubCutaneous every 24 hours  influenza   Vaccine 0.5 milliLiter(s) IntraMuscular once  levothyroxine 75 MICROGram(s) Oral daily  lidocaine   Patch 1 Patch Transdermal every 24 hours  metoprolol succinate ER 25 milliGRAM(s) Oral daily  OLANZapine 5 milliGRAM(s) Oral <User Schedule>  pantoprazole    Tablet 40 milliGRAM(s) Oral before breakfast  senna 2 Tablet(s) Oral at bedtime  sertraline 100 milliGRAM(s) Oral daily  warfarin 7.5 milliGRAM(s) Oral once    MEDICATIONS  (PRN):  ALPRAZolam 0.25 milliGRAM(s) Oral daily PRN anxiety  oxyCODONE    IR 5 milliGRAM(s) Oral every 4 hours PRN Moderate Pain (4 - 6)  oxyCODONE    IR 10 milliGRAM(s) Oral every 4 hours PRN Severe Pain (7 - 10)        CAPILLARY BLOOD GLUCOSE        I&O's Summary    09 Sep 2019 07:  -  10 Sep 2019 07:00  --------------------------------------------------------  IN: 560 mL / OUT: 1100 mL / NET: -540 mL    10 Sep 2019 07:  -  10 Sep 2019 12:55  --------------------------------------------------------  IN: 360 mL / OUT: 200 mL / NET: 160 mL        PHYSICAL EXAM:  GENERAL: NAD, frail   HEAD:  Atraumatic, Normocephalic  EYES: conjunctiva and sclera clear  NECK:  No JVD  CHEST/LUNG: diminished breath sounds right base ; No wheeze  HEART: Regular rate and rhythm; S1S2  ABDOMEN: Soft, Nontender, Nondistended; Bowel sounds present  EXTREMITIES:  No clubbing, cyanosis, or edema  PSYCH: AAOx3  NEUROLOGY: non-focal  SKIN: No rashes or lesions    LABS:                        10.1   5.5   )-----------( 168      ( 10 Sep 2019 10:02 )             30.7     -09    135  |  103  |  22  ----------------------------<  127<H>  3.8   |  21<L>  |  1.04    Ca    8.2<L>      09 Sep 2019 01:08  Phos  2.8       Mg     2.2     -      PT/INR - ( 10 Sep 2019 10:02 )   PT: 12.0 sec;   INR: 1.04 ratio         PTT - ( 09 Sep 2019 01:08 )  PTT:24.8 sec      Urinalysis Basic - ( 08 Sep 2019 17:30 )    Color: Light Yellow / Appearance: Clear / S.014 / pH: x  Gluc: x / Ketone: Negative  / Bili: Negative / Urobili: Negative   Blood: x / Protein: Negative / Nitrite: Negative   Leuk Esterase: Negative / RBC: x / WBC x   Sq Epi: x / Non Sq Epi: x / Bacteria: x        RADIOLOGY & ADDITIONAL TESTS:    Imaging Personally Reviewed:    Consultant(s) Notes Reviewed:  sx    Care Discussed with Consultants/Other Providers: sx

## 2019-09-10 NOTE — DISCHARGE NOTE PROVIDER - PROVIDER TOKENS
PROVIDER:[TOKEN:[[id number]],FOLLOWUP:[2 weeks]] PROVIDER:[TOKEN:[[id number]],FOLLOWUP:[2 weeks]],PROVIDER:[TOKEN:[[id number]],FOLLOWUP:[Routine]]

## 2019-09-10 NOTE — DISCHARGE NOTE PROVIDER - NSDCFUADDAPPT_GEN_ALL_CORE_FT
Dr Sy Chappell - Your PMD--Please call for a follow up appointment upon discharge regarding your recent fall, injury and hospitalization. We Recommend Ca/Vit D and Outpt osteoporosis workup.     Please follow up with your Trauma Doctor Dr Thompson only if needed at [address] [place] [address] [address] [place] [address] , phone #[phone].    You may use Salanpas 1% lidocaine patches over the counter for topical pain relief. Dr Sy Chappell - Your PMD- Please call for a follow up appointment upon discharge regarding your recent fall, injury and hospitalization. We recommend Ca/Vit D and Outpt osteoporosis workup.     Please follow up with your Trauma Doctor Dr Thompson only if needed at [address] [place] [address] [address] [place] [address], phone #[phone].    You may use Salanpas 1% lidocaine patches over the counter for topical pain relief.

## 2019-09-10 NOTE — PHYSICAL THERAPY INITIAL EVALUATION ADULT - ADDITIONAL COMMENTS
Pt lives in Adult independently living facility was an indep amb with Rollator or cane and indep with all ADLs. Pt was not required to perform IADLs at this living facility.

## 2019-09-10 NOTE — PHYSICAL THERAPY INITIAL EVALUATION ADULT - BALANCE TRAINING, PT EVAL
GOAL: Pt's static/dynamic sitting/standing balance will improve to fair+/good to increase stability, and decrease risk for falls when ambulating or performing ADL's

## 2019-09-10 NOTE — PROGRESS NOTE ADULT - ASSESSMENT
84 year old female, on coumadin for atrial fibrillation, PPM, hearing loss, depression, Takotsubo cardiomyopathy, hypothyroid, osteoporosis lives in assisted living, ambulates with walker/cane presents after a ground level fall found to have right 12th rib fracture and right superior pubic rami fracture with adjacent hematoma. SICU consulted for hemorrhage watch and INR reversal given acute drop in H/H and persistently elevated INR. Now stable on the floor

## 2019-09-10 NOTE — CHART NOTE - NSCHARTNOTEFT_GEN_A_CORE
Went to see pt for bp 89/52 while asleep. Pt resting comfortably in bed in NAD. States that she feels well. Denies cp, sob, palpitations, lightheadedness, HA. States that he BP is normally 90s-100s. Vitals otherwise stable and WNL. UOP adequate. Discussed with nurse to re-check vitals in 1 hr. Encouraged PO intake. Pacemaker interrogated today and found to be functioning normally.     Repeat vitals:   BP: 102/59  HR: 76     Will continue to monitor per routine.

## 2019-09-10 NOTE — DISCHARGE NOTE PROVIDER - NSDCFUADDINST_GEN_ALL_CORE_FT
Can follow up with Dr. Arredondo after discharge from hospital/rehab in 1 week- (WBAT, repeat Pelvic XR's in 1 week- 9/13).      PPM Interrogation s/p Fall  1. Battery longevity 5.5 years  2. Lead impedances WNL  3. Sensing and pacing thresholds WNL  4. No arrhythmia episodes stored to correlate with fall event  5. Normal pacemaker function  6. Follow up at [facility] Please follow up with Dr. Arredondo after discharge from hospital/rehab in 1-2 weeks- (WBAT, repeat Pelvic XR's in 1 week- 9/13).    PPM Interrogation s/p Fall  1. Battery longevity 5.5 years  2. Lead impedances WNL  3. Sensing and pacing thresholds WNL  4. No arrhythmia episodes stored to correlate with fall event  5. Normal pacemaker function  6. Follow up at [facility] Please follow up with Dr. Arredondo after discharge from hospital/rehab in 1-2 weeks- (weight bearing as tolerated , repeat Pelvic XR's in 1 week- 9/13).    PPM Interrogation s/p Fall  1. Battery longevity 5.5 years  2. Lead impedances WNL  3. Sensing and pacing thresholds WNL  4. No arrhythmia episodes stored to correlate with fall event  5. Normal pacemaker function  6. Follow up at [facility]

## 2019-09-10 NOTE — PROGRESS NOTE ADULT - PROBLEM SELECTOR PLAN 1
Pt found to have right 12th rib fracture s/p fall   Incentive spirometry  Pain control with tylenol, lidocaine patch, oxycodone prn   O2 supplementation prn   PT.

## 2019-09-10 NOTE — PHYSICAL THERAPY INITIAL EVALUATION ADULT - PLANNED THERAPY INTERVENTIONS, PT EVAL
bed mobility training/gait training/transfer training transfer training/balance training/bed mobility training/gait training

## 2019-09-11 LAB
HCT VFR BLD CALC: 30.2 % — LOW (ref 34.5–45)
HGB BLD-MCNC: 10 G/DL — LOW (ref 11.5–15.5)
INR BLD: 1.03 RATIO — SIGNIFICANT CHANGE UP (ref 0.88–1.16)
MCHC RBC-ENTMCNC: 29.5 PG — SIGNIFICANT CHANGE UP (ref 27–34)
MCHC RBC-ENTMCNC: 32.9 GM/DL — SIGNIFICANT CHANGE UP (ref 32–36)
MCV RBC AUTO: 89.7 FL — SIGNIFICANT CHANGE UP (ref 80–100)
PLATELET # BLD AUTO: 207 K/UL — SIGNIFICANT CHANGE UP (ref 150–400)
PROTHROM AB SERPL-ACNC: 11.8 SEC — SIGNIFICANT CHANGE UP (ref 10–12.9)
RBC # BLD: 3.37 M/UL — LOW (ref 3.8–5.2)
RBC # FLD: 13.8 % — SIGNIFICANT CHANGE UP (ref 10.3–14.5)
WBC # BLD: 6 K/UL — SIGNIFICANT CHANGE UP (ref 3.8–10.5)
WBC # FLD AUTO: 6 K/UL — SIGNIFICANT CHANGE UP (ref 3.8–10.5)

## 2019-09-11 PROCEDURE — 99233 SBSQ HOSP IP/OBS HIGH 50: CPT

## 2019-09-11 PROCEDURE — 99232 SBSQ HOSP IP/OBS MODERATE 35: CPT

## 2019-09-11 RX ORDER — LANOLIN ALCOHOL/MO/W.PET/CERES
1 CREAM (GRAM) TOPICAL
Qty: 0 | Refills: 0 | DISCHARGE
Start: 2019-09-11

## 2019-09-11 RX ORDER — WARFARIN SODIUM 2.5 MG/1
7.5 TABLET ORAL ONCE
Refills: 0 | Status: COMPLETED | OUTPATIENT
Start: 2019-09-11 | End: 2019-09-11

## 2019-09-11 RX ADMIN — OXYCODONE HYDROCHLORIDE 5 MILLIGRAM(S): 5 TABLET ORAL at 21:41

## 2019-09-11 RX ADMIN — Medication 975 MILLIGRAM(S): at 17:28

## 2019-09-11 RX ADMIN — SERTRALINE 100 MILLIGRAM(S): 25 TABLET, FILM COATED ORAL at 12:43

## 2019-09-11 RX ADMIN — Medication 100 MILLIGRAM(S): at 06:05

## 2019-09-11 RX ADMIN — OXYCODONE HYDROCHLORIDE 5 MILLIGRAM(S): 5 TABLET ORAL at 06:50

## 2019-09-11 RX ADMIN — Medication 975 MILLIGRAM(S): at 12:43

## 2019-09-11 RX ADMIN — Medication 1 TABLET(S): at 06:05

## 2019-09-11 RX ADMIN — Medication 1000 UNIT(S): at 12:43

## 2019-09-11 RX ADMIN — WARFARIN SODIUM 7.5 MILLIGRAM(S): 2.5 TABLET ORAL at 21:52

## 2019-09-11 RX ADMIN — Medication 1 TABLET(S): at 22:07

## 2019-09-11 RX ADMIN — Medication 975 MILLIGRAM(S): at 00:40

## 2019-09-11 RX ADMIN — Medication 1 TABLET(S): at 14:58

## 2019-09-11 RX ADMIN — Medication 100 MILLIGRAM(S): at 21:40

## 2019-09-11 RX ADMIN — Medication 975 MILLIGRAM(S): at 18:00

## 2019-09-11 RX ADMIN — LIDOCAINE 1 PATCH: 4 CREAM TOPICAL at 17:28

## 2019-09-11 RX ADMIN — OLANZAPINE 5 MILLIGRAM(S): 15 TABLET, FILM COATED ORAL at 21:40

## 2019-09-11 RX ADMIN — Medication 25 MILLIGRAM(S): at 06:05

## 2019-09-11 RX ADMIN — Medication 75 MICROGRAM(S): at 06:05

## 2019-09-11 RX ADMIN — LIDOCAINE 1 PATCH: 4 CREAM TOPICAL at 19:26

## 2019-09-11 RX ADMIN — SENNA PLUS 2 TABLET(S): 8.6 TABLET ORAL at 21:41

## 2019-09-11 RX ADMIN — PANTOPRAZOLE SODIUM 40 MILLIGRAM(S): 20 TABLET, DELAYED RELEASE ORAL at 06:05

## 2019-09-11 RX ADMIN — OXYCODONE HYDROCHLORIDE 5 MILLIGRAM(S): 5 TABLET ORAL at 06:05

## 2019-09-11 RX ADMIN — ENOXAPARIN SODIUM 40 MILLIGRAM(S): 100 INJECTION SUBCUTANEOUS at 08:41

## 2019-09-11 RX ADMIN — Medication 975 MILLIGRAM(S): at 06:07

## 2019-09-11 RX ADMIN — Medication 975 MILLIGRAM(S): at 13:15

## 2019-09-11 RX ADMIN — LIDOCAINE 1 PATCH: 4 CREAM TOPICAL at 05:00

## 2019-09-11 RX ADMIN — Medication 975 MILLIGRAM(S): at 06:50

## 2019-09-11 RX ADMIN — OXYCODONE HYDROCHLORIDE 5 MILLIGRAM(S): 5 TABLET ORAL at 22:20

## 2019-09-11 NOTE — PROGRESS NOTE ADULT - SUBJECTIVE AND OBJECTIVE BOX
Patient is a 84y old  Female who presents with a chief complaint of s/p fall (11 Sep 2019 07:36)      SUBJECTIVE / OVERNIGHT EVENTS: feels better, pain on right side is better, no cp, sob, abdominal pain     MEDICATIONS  (STANDING):  acetaminophen   Tablet .. 975 milliGRAM(s) Oral every 6 hours  calcium carbonate 1250 mG  + Vitamin D (OsCal 500 + D) 1 Tablet(s) Oral three times a day  cholecalciferol 1000 Unit(s) Oral daily  docusate sodium 100 milliGRAM(s) Oral three times a day  enoxaparin Injectable 40 milliGRAM(s) SubCutaneous every 24 hours  influenza   Vaccine 0.5 milliLiter(s) IntraMuscular once  levothyroxine 75 MICROGram(s) Oral daily  lidocaine   Patch 1 Patch Transdermal every 24 hours  melatonin 3 milliGRAM(s) Oral at bedtime  metoprolol succinate ER 25 milliGRAM(s) Oral daily  OLANZapine 5 milliGRAM(s) Oral <User Schedule>  pantoprazole    Tablet 40 milliGRAM(s) Oral before breakfast  senna 2 Tablet(s) Oral at bedtime  sertraline 100 milliGRAM(s) Oral daily  warfarin 7.5 milliGRAM(s) Oral once    MEDICATIONS  (PRN):  ALPRAZolam 0.25 milliGRAM(s) Oral daily PRN anxiety  oxyCODONE    IR 5 milliGRAM(s) Oral every 4 hours PRN Moderate Pain (4 - 6)  oxyCODONE    IR 10 milliGRAM(s) Oral every 4 hours PRN Severe Pain (7 - 10)        CAPILLARY BLOOD GLUCOSE        I&O's Summary    10 Sep 2019 07:01  -  11 Sep 2019 07:00  --------------------------------------------------------  IN: 1200 mL / OUT: 1750 mL / NET: -550 mL    11 Sep 2019 07:01  -  11 Sep 2019 12:51  --------------------------------------------------------  IN: 360 mL / OUT: 0 mL / NET: 360 mL        PHYSICAL EXAM:  GENERAL: NAD, well-developed  HEAD:  Atraumatic, Normocephalic  EYES: conjunctiva and sclera clear  NECK:  No JVD  CHEST/LUNG: Clear to auscultation bilaterally; No wheeze  HEART: Regular rate and rhythm; S1S2  ABDOMEN: Soft, Nontender, Nondistended; Bowel sounds present  EXTREMITIES:  2+ Peripheral Pulses, No clubbing, cyanosis, or edema  PSYCH: AAOx3  NEUROLOGY: non-focal  SKIN: No rashes or lesions    LABS:                        10.0   6.0   )-----------( 207      ( 11 Sep 2019 09:19 )             30.2           PT/INR - ( 11 Sep 2019 09:19 )   PT: 11.8 sec;   INR: 1.03 ratio                   RADIOLOGY & ADDITIONAL TESTS:    Imaging Personally Reviewed:    Consultant(s) Notes Reviewed:  sx    Care Discussed with Consultants/Other Providers: sx

## 2019-09-11 NOTE — PROGRESS NOTE ADULT - ATTENDING COMMENTS
Pt seen and examined.  Chart reviewed.   Resident note confirmed.  Pt is an 84 year old female with a medical history signficant for CAD, Afib ((on Coumadin), Takotsubo cardiomyopathy,  who presented to [facility] s/p fall. work up revealed right 12th rib fracture and right superior pubic rami fracture with adjacent hematoma. SICU consulted for hemorrhage watch and INR reversal given acute drop in H/H and persistently elevated INR. H&H was stable for 3 days and coumadin was restarted.  No acute events.    continue pain control.  Continue ISP  Continue cardiac meds  Continue cardiac diet  Replace and monitor lytes  Monitor INR  PT eval  Pt will require AWAIS upon discharge.

## 2019-09-11 NOTE — PROGRESS NOTE ADULT - ASSESSMENT
83 y/o F with a history of Afib on Coumadin, hypothyroid, Takotsubo cardiomyopathy, pacemaker, who presents after a fall found to have right 12th rib fracture and right superior pubic rami fracture with adjacent hematoma. SICU consulted for hemorrhage watch and INR reversal given acute drop in H/H and persistently elevated INR -> H&H stable last 3 days     -pain control   -f/u am labs   -regular diet  -OOB/IS   -dvt ppx   -PT rec: AWAIS     [facility] [id number]

## 2019-09-11 NOTE — PROGRESS NOTE ADULT - SUBJECTIVE AND OBJECTIVE BOX
ACS SURGERY DAILY PROGRESS NOTE:     Interval:   Restarted home coumadin, pacemaker interrogated and found to be normal   Hypotensive with PT yesterday and again while sleeping with SBP 89. Asymptomatic. Vitals otherwise stable. Encouraged PO, BP stable in 90s-100s overnight. No acute events.     SUBJECTIVE:     Patient feels well. Pain well controlled.  Denies chest pain, shortness of breath, lightheadedness HA, weakness, nausea, vomiting, fever chills.     OBJECTIVE:    MEDICATIONS  (STANDING):  acetaminophen   Tablet .. 975 milliGRAM(s) Oral every 6 hours  calcium carbonate 1250 mG  + Vitamin D (OsCal 500 + D) 1 Tablet(s) Oral three times a day  cholecalciferol 1000 Unit(s) Oral daily  docusate sodium 100 milliGRAM(s) Oral three times a day  enoxaparin Injectable 40 milliGRAM(s) SubCutaneous every 24 hours  influenza   Vaccine 0.5 milliLiter(s) IntraMuscular once  levothyroxine 75 MICROGram(s) Oral daily  lidocaine   Patch 1 Patch Transdermal every 24 hours  melatonin 3 milliGRAM(s) Oral at bedtime  metoprolol succinate ER 25 milliGRAM(s) Oral daily  OLANZapine 5 milliGRAM(s) Oral <User Schedule>  pantoprazole    Tablet 40 milliGRAM(s) Oral before breakfast  senna 2 Tablet(s) Oral at bedtime  sertraline 100 milliGRAM(s) Oral daily    MEDICATIONS  (PRN):  ALPRAZolam 0.25 milliGRAM(s) Oral daily PRN anxiety  oxyCODONE    IR 5 milliGRAM(s) Oral every 4 hours PRN Moderate Pain (4 - 6)  oxyCODONE    IR 10 milliGRAM(s) Oral every 4 hours PRN Severe Pain (7 - 10)      Vital Signs Last 24 Hrs  T(C): 36.6 (11 Sep 2019 05:03), Max: 36.7 (10 Sep 2019 15:36)  T(F): 97.9 (11 Sep 2019 05:03), Max: 98.1 (10 Sep 2019 15:36)  HR: 66 (11 Sep 2019 05:03) (63 - 80)  BP: 133/71 (11 Sep 2019 05:03) (89/52 - 133/71)  BP(mean): --  RR: 18 (11 Sep 2019 05:03) (18 - 18)  SpO2: 94% (11 Sep 2019 05:03) (94% - 95%)      I&O's Detail    10 Sep 2019 07:01  -  11 Sep 2019 07:00  --------------------------------------------------------  IN:    Oral Fluid: 1200 mL  Total IN: 1200 mL    OUT:    Voided: 1750 mL  Total OUT: 1750 mL    Total NET: -550 mL      LABS:                        10.1   5.5   )-----------( 168      ( 10 Sep 2019 10:02 )             30.7           PT/INR - ( 10 Sep 2019 10:02 )   PT: 12.0 sec;   INR: 1.04 ratio         PHYSICAL EXAM:  Constitutional: well developed, well nourished, NAD  ENMT: normal facies, symmetric  Respiratory: Normal respiratory effort   Gastrointestinal: abdomen soft, nontender, nondistended.  MSK: minimal right flank tenderness, improved. Pelvic pain with ROM.    Psychiatric: oriented x 3; appropriate

## 2019-09-12 LAB
HCT VFR BLD CALC: 30 % — LOW (ref 34.5–45)
HGB BLD-MCNC: 9.8 G/DL — LOW (ref 11.5–15.5)
INR BLD: 1.18 RATIO — HIGH (ref 0.88–1.16)
MCHC RBC-ENTMCNC: 28.9 PG — SIGNIFICANT CHANGE UP (ref 27–34)
MCHC RBC-ENTMCNC: 32.7 GM/DL — SIGNIFICANT CHANGE UP (ref 32–36)
MCV RBC AUTO: 88.5 FL — SIGNIFICANT CHANGE UP (ref 80–100)
PLATELET # BLD AUTO: 234 K/UL — SIGNIFICANT CHANGE UP (ref 150–400)
PROTHROM AB SERPL-ACNC: 13.4 SEC — HIGH (ref 10–13.1)
RBC # BLD: 3.39 M/UL — LOW (ref 3.8–5.2)
RBC # FLD: 15.4 % — HIGH (ref 10.3–14.5)
WBC # BLD: 5.71 K/UL — SIGNIFICANT CHANGE UP (ref 3.8–10.5)
WBC # FLD AUTO: 5.71 K/UL — SIGNIFICANT CHANGE UP (ref 3.8–10.5)

## 2019-09-12 PROCEDURE — 99232 SBSQ HOSP IP/OBS MODERATE 35: CPT

## 2019-09-12 PROCEDURE — 99233 SBSQ HOSP IP/OBS HIGH 50: CPT

## 2019-09-12 RX ORDER — WARFARIN SODIUM 2.5 MG/1
7.5 TABLET ORAL ONCE
Refills: 0 | Status: COMPLETED | OUTPATIENT
Start: 2019-09-12 | End: 2019-09-12

## 2019-09-12 RX ORDER — METOPROLOL TARTRATE 50 MG
12.5 TABLET ORAL DAILY
Refills: 0 | Status: DISCONTINUED | OUTPATIENT
Start: 2019-09-12 | End: 2019-09-12

## 2019-09-12 RX ADMIN — Medication 975 MILLIGRAM(S): at 00:03

## 2019-09-12 RX ADMIN — SERTRALINE 100 MILLIGRAM(S): 25 TABLET, FILM COATED ORAL at 12:52

## 2019-09-12 RX ADMIN — Medication 100 MILLIGRAM(S): at 05:57

## 2019-09-12 RX ADMIN — Medication 1000 UNIT(S): at 12:52

## 2019-09-12 RX ADMIN — Medication 975 MILLIGRAM(S): at 12:49

## 2019-09-12 RX ADMIN — ENOXAPARIN SODIUM 40 MILLIGRAM(S): 100 INJECTION SUBCUTANEOUS at 10:39

## 2019-09-12 RX ADMIN — Medication 1 TABLET(S): at 05:58

## 2019-09-12 RX ADMIN — PANTOPRAZOLE SODIUM 40 MILLIGRAM(S): 20 TABLET, DELAYED RELEASE ORAL at 05:58

## 2019-09-12 RX ADMIN — OXYCODONE HYDROCHLORIDE 5 MILLIGRAM(S): 5 TABLET ORAL at 21:25

## 2019-09-12 RX ADMIN — Medication 75 MICROGRAM(S): at 05:58

## 2019-09-12 RX ADMIN — Medication 1 TABLET(S): at 12:52

## 2019-09-12 RX ADMIN — LIDOCAINE 1 PATCH: 4 CREAM TOPICAL at 18:08

## 2019-09-12 RX ADMIN — Medication 975 MILLIGRAM(S): at 00:45

## 2019-09-12 RX ADMIN — Medication 975 MILLIGRAM(S): at 13:49

## 2019-09-12 RX ADMIN — Medication 975 MILLIGRAM(S): at 17:51

## 2019-09-12 RX ADMIN — Medication 975 MILLIGRAM(S): at 18:42

## 2019-09-12 RX ADMIN — Medication 25 MILLIGRAM(S): at 06:31

## 2019-09-12 RX ADMIN — Medication 100 MILLIGRAM(S): at 12:52

## 2019-09-12 RX ADMIN — Medication 975 MILLIGRAM(S): at 22:43

## 2019-09-12 RX ADMIN — OXYCODONE HYDROCHLORIDE 5 MILLIGRAM(S): 5 TABLET ORAL at 22:00

## 2019-09-12 RX ADMIN — Medication 0.25 MILLIGRAM(S): at 22:42

## 2019-09-12 RX ADMIN — Medication 975 MILLIGRAM(S): at 23:15

## 2019-09-12 RX ADMIN — SENNA PLUS 2 TABLET(S): 8.6 TABLET ORAL at 21:24

## 2019-09-12 RX ADMIN — Medication 100 MILLIGRAM(S): at 21:24

## 2019-09-12 RX ADMIN — OXYCODONE HYDROCHLORIDE 5 MILLIGRAM(S): 5 TABLET ORAL at 06:45

## 2019-09-12 RX ADMIN — Medication 975 MILLIGRAM(S): at 06:45

## 2019-09-12 RX ADMIN — Medication 1 TABLET(S): at 21:24

## 2019-09-12 RX ADMIN — OXYCODONE HYDROCHLORIDE 5 MILLIGRAM(S): 5 TABLET ORAL at 05:58

## 2019-09-12 RX ADMIN — LIDOCAINE 1 PATCH: 4 CREAM TOPICAL at 17:53

## 2019-09-12 RX ADMIN — WARFARIN SODIUM 7.5 MILLIGRAM(S): 2.5 TABLET ORAL at 21:24

## 2019-09-12 RX ADMIN — Medication 975 MILLIGRAM(S): at 05:58

## 2019-09-12 RX ADMIN — LIDOCAINE 1 PATCH: 4 CREAM TOPICAL at 05:00

## 2019-09-12 NOTE — PROGRESS NOTE ADULT - ATTENDING COMMENTS
Pt seen and examined.  Chart reviewed.   Resident note confirmed.  Pt is an 84 year old female with a medical history significant for CAD, Afib ((on Coumadin), Takotsubo cardiomyopathy,  who presented to [facility] s/p fall. work up revealed right 12th rib fracture and right superior pubic rami fracture with adjacent hematoma. SICU consulted for hemorrhage watch and INR reversal given acute drop in H/H and persistently elevated INR. H&H was stable for 3 days and coumadin was restarted.  Pt with orthostatic changes when standing.    continue pain control.  Continue ISP  Decrease b-blocker to 12.5mg QD  Hospitalist evaluation  Continue cardiac diet  Replace and monitor lytes  Monitor INR  PT eval in progress  Pt will require AWAIS upon discharge.

## 2019-09-12 NOTE — PROGRESS NOTE ADULT - PROBLEM SELECTOR PLAN 10
PMD Dr Sy Chappell   ISTOP [phone] xanax 0.25mg dispensed 9/4 30 day supply, 90 days  Meds and hx verified with cardiologist office Dr. Raines [phone]  tylenol, astelin 0.1% nasal spray, biotin 300, prolia q6 months, colace, synthroid 75mcg, antivert 25mg tid prn, lopressor 25mg qd prn, zyprexa 10mg 3x/wk, protonix 40mg, zantac 300mg, zoloft 100mg, vit d 5000u, coumadin

## 2019-09-12 NOTE — PROGRESS NOTE ADULT - ASSESSMENT
A/P: 85 y/o F with a history of Afib on Coumadin, hypothyroid, Takotsubo cardiomyopathy, pacemaker, who presents after a fall found to have right 12th rib fracture and right superior pubic rami fracture with adjacent hematoma. SICU consulted for hemorrhage watch and INR reversal given acute drop in H/H and persistently elevated INR -> H&H stable last 3 days     -pain control   -f/u am labs   -monitor and replete electrolytes  -regular diet  -OOB/IS   -DVT ppx   -continue home meds  -PT rec: AWAIS     [facility] [id number] A/P: 85 y/o F with a history of Afib on Coumadin, hypothyroid, Takotsubo cardiomyopathy, pacemaker, who presents after a fall found to have right 12th rib fracture and right superior pubic rami fracture with adjacent hematoma. SICU consulted for hemorrhage watch and INR reversal given acute drop in H/H and persistently elevated INR -> H&H stable.    -pain control   -f/u am labs   -monitor and replete electrolytes  -regular diet  -OOB/IS   -DVT ppx   -continue home meds  -f/u orthostatics   -consult medicine regarding orthostatic hypotension   -PT rec: AWAIS     [facility] [id number]

## 2019-09-12 NOTE — PROGRESS NOTE ADULT - SUBJECTIVE AND OBJECTIVE BOX
Patient is a 84y old  Female who presents with a chief complaint of s/p fall (12 Sep 2019 03:07)      SUBJECTIVE / OVERNIGHT EVENTS: pt feels better, right sided hip and axillary pain is better, had bm, no cp, sob, prior episode of being dizzy when evaluated by PT    MEDICATIONS  (STANDING):  acetaminophen   Tablet .. 975 milliGRAM(s) Oral every 6 hours  calcium carbonate 1250 mG  + Vitamin D (OsCal 500 + D) 1 Tablet(s) Oral three times a day  cholecalciferol 1000 Unit(s) Oral daily  docusate sodium 100 milliGRAM(s) Oral three times a day  enoxaparin Injectable 40 milliGRAM(s) SubCutaneous every 24 hours  influenza   Vaccine 0.5 milliLiter(s) IntraMuscular once  levothyroxine 75 MICROGram(s) Oral daily  lidocaine   Patch 1 Patch Transdermal every 24 hours  melatonin 3 milliGRAM(s) Oral at bedtime  OLANZapine 5 milliGRAM(s) Oral <User Schedule>  pantoprazole    Tablet 40 milliGRAM(s) Oral before breakfast  senna 2 Tablet(s) Oral at bedtime  sertraline 100 milliGRAM(s) Oral daily    MEDICATIONS  (PRN):  ALPRAZolam 0.25 milliGRAM(s) Oral daily PRN anxiety  oxyCODONE    IR 5 milliGRAM(s) Oral every 4 hours PRN Moderate Pain (4 - 6)  oxyCODONE    IR 10 milliGRAM(s) Oral every 4 hours PRN Severe Pain (7 - 10)        CAPILLARY BLOOD GLUCOSE        I&O's Summary    11 Sep 2019 07:01  -  12 Sep 2019 07:00  --------------------------------------------------------  IN: 1320 mL / OUT: 1250 mL / NET: 70 mL    12 Sep 2019 07:01  -  12 Sep 2019 12:26  --------------------------------------------------------  IN: 420 mL / OUT: 0 mL / NET: 420 mL        PHYSICAL EXAM:  GENERAL: NAD, well-developed  HEAD:  Atraumatic, Normocephalic  EYES: conjunctiva and sclera clear  NECK:  No JVD  CHEST/LUNG: diminished breath sounds left base; No wheeze  HEART: Regular rate and rhythm; S1S  ABDOMEN: Soft, Nontender, Nondistended; Bowel sounds present  EXTREMITIES:  2+ Peripheral Pulses, No clubbing, cyanosis, or edema  PSYCH: AAOx3  NEUROLOGY: non-focal  SKIN: No rashes or lesions    LABS:                        9.8    5.71  )-----------( 234      ( 12 Sep 2019 08:45 )             30.0           PT/INR - ( 12 Sep 2019 08:31 )   PT: 13.4 sec;   INR: 1.18 ratio                   RADIOLOGY & ADDITIONAL TESTS:    Imaging Personally Reviewed:    Consultant(s) Notes Reviewed:  sx    Care Discussed with Consultants/Other Providers: sx

## 2019-09-12 NOTE — PROGRESS NOTE ADULT - SUBJECTIVE AND OBJECTIVE BOX
Trauma Surgery Progress Note    SUBJECTIVE: Pt seen and examined at bedside. Pain well controlled. Denies chest pain, SOB, lightheadedness, weakness, N/V/D, or fevers.     Ronda:  SHANT:  DARCY:  MEDICATIONS  (STANDING):  acetaminophen   Tablet .. 975 milliGRAM(s) Oral every 6 hours  calcium carbonate 1250 mG  + Vitamin D (OsCal 500 + D) 1 Tablet(s) Oral three times a day  cholecalciferol 1000 Unit(s) Oral daily  docusate sodium 100 milliGRAM(s) Oral three times a day  enoxaparin Injectable 40 milliGRAM(s) SubCutaneous every 24 hours  influenza   Vaccine 0.5 milliLiter(s) IntraMuscular once  levothyroxine 75 MICROGram(s) Oral daily  lidocaine   Patch 1 Patch Transdermal every 24 hours  melatonin 3 milliGRAM(s) Oral at bedtime  metoprolol succinate ER 25 milliGRAM(s) Oral daily  OLANZapine 5 milliGRAM(s) Oral <User Schedule>  pantoprazole    Tablet 40 milliGRAM(s) Oral before breakfast  senna 2 Tablet(s) Oral at bedtime  sertraline 100 milliGRAM(s) Oral daily    MEDICATIONS  (PRN):  ALPRAZolam 0.25 milliGRAM(s) Oral daily PRN anxiety  oxyCODONE    IR 5 milliGRAM(s) Oral every 4 hours PRN Moderate Pain (4 - 6)  oxyCODONE    IR 10 milliGRAM(s) Oral every 4 hours PRN Severe Pain (7 - 10)      OBJECTIVE:    Vital Signs Last 24 Hrs  T(C): 37.6 (12 Sep 2019 01:00), Max: 37.6 (12 Sep 2019 01:00)  T(F): 99.6 (12 Sep 2019 01:00), Max: 99.6 (12 Sep 2019 01:00)  HR: 64 (12 Sep 2019 01:00) (64 - 72)  BP: 103/52 (12 Sep 2019 01:00) (97/60 - 133/71)  BP(mean): --  RR: 18 (12 Sep 2019 01:00) (17 - 18)  SpO2: 96% (12 Sep 2019 01:00) (93% - 96%)    General Appearance: Uncomfortable due to nausea  Neck: Supple  Chest: non-labored breathing, no respiratory distress  CV: Pulse regular presently  Abdomen: Soft, non-tender, non-distended  MSK: improved right flank tenderness, Pelvic pain with ROM  Extremities: warm and well perfused    I&O's Summary    10 Sep 2019 07:01  -  11 Sep 2019 07:00  --------------------------------------------------------  IN: 1200 mL / OUT: 1750 mL / NET: -550 mL    11 Sep 2019 07:01  -  12 Sep 2019 03:07  --------------------------------------------------------  IN: 1200 mL / OUT: 800 mL / NET: 400 mL      I&O's Detail    10 Sep 2019 07:01  -  11 Sep 2019 07:00  --------------------------------------------------------  IN:    Oral Fluid: 1200 mL  Total IN: 1200 mL    OUT:    Voided: 1750 mL  Total OUT: 1750 mL    Total NET: -550 mL      11 Sep 2019 07:01  -  12 Sep 2019 03:07  --------------------------------------------------------  IN:    Oral Fluid: 1200 mL  Total IN: 1200 mL    OUT:    Voided: 800 mL  Total OUT: 800 mL    Total NET: 400 mL            LABS:                        10.0   6.0   )-----------( 207      ( 11 Sep 2019 09:19 )             30.2           PT/INR - ( 11 Sep 2019 09:19 )   PT: 11.8 sec;   INR: 1.03 ratio               RADIOLOGY & ADDITIONAL STUDIES: Trauma Surgery Progress Note    SUBJECTIVE: Pt seen and examined at bedside. Pain well controlled. Denies chest pain, SOB, lightheadedness, weakness, N/V/D, or fevers.       MEDICATIONS  (STANDING):  acetaminophen   Tablet .. 975 milliGRAM(s) Oral every 6 hours  calcium carbonate 1250 mG  + Vitamin D (OsCal 500 + D) 1 Tablet(s) Oral three times a day  cholecalciferol 1000 Unit(s) Oral daily  docusate sodium 100 milliGRAM(s) Oral three times a day  enoxaparin Injectable 40 milliGRAM(s) SubCutaneous every 24 hours  influenza   Vaccine 0.5 milliLiter(s) IntraMuscular once  levothyroxine 75 MICROGram(s) Oral daily  lidocaine   Patch 1 Patch Transdermal every 24 hours  melatonin 3 milliGRAM(s) Oral at bedtime  metoprolol succinate ER 25 milliGRAM(s) Oral daily  OLANZapine 5 milliGRAM(s) Oral <User Schedule>  pantoprazole    Tablet 40 milliGRAM(s) Oral before breakfast  senna 2 Tablet(s) Oral at bedtime  sertraline 100 milliGRAM(s) Oral daily    MEDICATIONS  (PRN):  ALPRAZolam 0.25 milliGRAM(s) Oral daily PRN anxiety  oxyCODONE    IR 5 milliGRAM(s) Oral every 4 hours PRN Moderate Pain (4 - 6)  oxyCODONE    IR 10 milliGRAM(s) Oral every 4 hours PRN Severe Pain (7 - 10)      OBJECTIVE:    Vital Signs Last 24 Hrs  T(C): 37.6 (12 Sep 2019 01:00), Max: 37.6 (12 Sep 2019 01:00)  T(F): 99.6 (12 Sep 2019 01:00), Max: 99.6 (12 Sep 2019 01:00)  HR: 64 (12 Sep 2019 01:00) (64 - 72)  BP: 103/52 (12 Sep 2019 01:00) (97/60 - 133/71)  BP(mean): --  RR: 18 (12 Sep 2019 01:00) (17 - 18)  SpO2: 96% (12 Sep 2019 01:00) (93% - 96%)    General Appearance: Uncomfortable due to nausea  Neck: Supple  Chest: non-labored breathing, no respiratory distress  CV: Pulse regular presently  Abdomen: Soft, non-tender, non-distended  MSK: improved right flank tenderness, Pelvic pain with ROM  Extremities: warm and well perfused    I&O's Summary    10 Sep 2019 07:01  -  11 Sep 2019 07:00  --------------------------------------------------------  IN: 1200 mL / OUT: 1750 mL / NET: -550 mL    11 Sep 2019 07:01  -  12 Sep 2019 03:07  --------------------------------------------------------  IN: 1200 mL / OUT: 800 mL / NET: 400 mL      I&O's Detail    10 Sep 2019 07:01  -  11 Sep 2019 07:00  --------------------------------------------------------  IN:    Oral Fluid: 1200 mL  Total IN: 1200 mL    OUT:    Voided: 1750 mL  Total OUT: 1750 mL    Total NET: -550 mL      11 Sep 2019 07:01  -  12 Sep 2019 03:07  --------------------------------------------------------  IN:    Oral Fluid: 1200 mL  Total IN: 1200 mL    OUT:    Voided: 800 mL  Total OUT: 800 mL    Total NET: 400 mL            LABS:                        10.0   6.0   )-----------( 207      ( 11 Sep 2019 09:19 )             30.2           PT/INR - ( 11 Sep 2019 09:19 )   PT: 11.8 sec;   INR: 1.03 ratio

## 2019-09-13 ENCOUNTER — TRANSCRIPTION ENCOUNTER (OUTPATIENT)
Age: 84
End: 2019-09-13

## 2019-09-13 ENCOUNTER — INBOUND DOCUMENT (OUTPATIENT)
Age: 84
End: 2019-09-13

## 2019-09-13 VITALS
HEART RATE: 64 BPM | OXYGEN SATURATION: 95 % | DIASTOLIC BLOOD PRESSURE: 64 MMHG | TEMPERATURE: 98 F | RESPIRATION RATE: 18 BRPM | SYSTOLIC BLOOD PRESSURE: 107 MMHG

## 2019-09-13 LAB
ANION GAP SERPL CALC-SCNC: 13 MMOL/L — SIGNIFICANT CHANGE UP (ref 5–17)
APTT BLD: 31 SEC — SIGNIFICANT CHANGE UP (ref 27.5–36.3)
BUN SERPL-MCNC: 21 MG/DL — SIGNIFICANT CHANGE UP (ref 7–23)
CALCIUM SERPL-MCNC: 8.9 MG/DL — SIGNIFICANT CHANGE UP (ref 8.4–10.5)
CHLORIDE SERPL-SCNC: 106 MMOL/L — SIGNIFICANT CHANGE UP (ref 96–108)
CO2 SERPL-SCNC: 24 MMOL/L — SIGNIFICANT CHANGE UP (ref 22–31)
CREAT SERPL-MCNC: 0.78 MG/DL — SIGNIFICANT CHANGE UP (ref 0.5–1.3)
GLUCOSE SERPL-MCNC: 98 MG/DL — SIGNIFICANT CHANGE UP (ref 70–99)
HCT VFR BLD CALC: 28.7 % — LOW (ref 34.5–45)
HGB BLD-MCNC: 9.4 G/DL — LOW (ref 11.5–15.5)
INR BLD: 1.38 RATIO — HIGH (ref 0.88–1.16)
MAGNESIUM SERPL-MCNC: 1.9 MG/DL — SIGNIFICANT CHANGE UP (ref 1.6–2.6)
MCHC RBC-ENTMCNC: 29 PG — SIGNIFICANT CHANGE UP (ref 27–34)
MCHC RBC-ENTMCNC: 32.8 GM/DL — SIGNIFICANT CHANGE UP (ref 32–36)
MCV RBC AUTO: 88.6 FL — SIGNIFICANT CHANGE UP (ref 80–100)
PHOSPHATE SERPL-MCNC: 3.2 MG/DL — SIGNIFICANT CHANGE UP (ref 2.5–4.5)
PLATELET # BLD AUTO: 248 K/UL — SIGNIFICANT CHANGE UP (ref 150–400)
POTASSIUM SERPL-MCNC: 3.7 MMOL/L — SIGNIFICANT CHANGE UP (ref 3.5–5.3)
POTASSIUM SERPL-SCNC: 3.7 MMOL/L — SIGNIFICANT CHANGE UP (ref 3.5–5.3)
PROTHROM AB SERPL-ACNC: 16 SEC — HIGH (ref 10–13.1)
RBC # BLD: 3.24 M/UL — LOW (ref 3.8–5.2)
RBC # FLD: 15.6 % — HIGH (ref 10.3–14.5)
SODIUM SERPL-SCNC: 143 MMOL/L — SIGNIFICANT CHANGE UP (ref 135–145)
WBC # BLD: 6.29 K/UL — SIGNIFICANT CHANGE UP (ref 3.8–10.5)
WBC # FLD AUTO: 6.29 K/UL — SIGNIFICANT CHANGE UP (ref 3.8–10.5)

## 2019-09-13 PROCEDURE — 99238 HOSP IP/OBS DSCHRG MGMT 30/<: CPT

## 2019-09-13 PROCEDURE — 99233 SBSQ HOSP IP/OBS HIGH 50: CPT

## 2019-09-13 RX ORDER — METOPROLOL TARTRATE 50 MG
1 TABLET ORAL
Qty: 0 | Refills: 0 | DISCHARGE
Start: 2019-09-13

## 2019-09-13 RX ORDER — LIDOCAINE 4 G/100G
1 CREAM TOPICAL
Qty: 0 | Refills: 0 | DISCHARGE
Start: 2019-09-13

## 2019-09-13 RX ORDER — METOPROLOL TARTRATE 50 MG
25 TABLET ORAL
Qty: 0 | Refills: 0 | DISCHARGE

## 2019-09-13 RX ADMIN — PANTOPRAZOLE SODIUM 40 MILLIGRAM(S): 20 TABLET, DELAYED RELEASE ORAL at 05:29

## 2019-09-13 RX ADMIN — Medication 1 TABLET(S): at 09:27

## 2019-09-13 RX ADMIN — Medication 0.25 MILLIGRAM(S): at 14:30

## 2019-09-13 RX ADMIN — Medication 975 MILLIGRAM(S): at 13:34

## 2019-09-13 RX ADMIN — Medication 1000 UNIT(S): at 12:41

## 2019-09-13 RX ADMIN — Medication 975 MILLIGRAM(S): at 12:41

## 2019-09-13 RX ADMIN — SERTRALINE 100 MILLIGRAM(S): 25 TABLET, FILM COATED ORAL at 13:34

## 2019-09-13 RX ADMIN — Medication 100 MILLIGRAM(S): at 05:29

## 2019-09-13 RX ADMIN — LIDOCAINE 1 PATCH: 4 CREAM TOPICAL at 05:26

## 2019-09-13 RX ADMIN — Medication 100 MILLIGRAM(S): at 14:30

## 2019-09-13 RX ADMIN — Medication 75 MICROGRAM(S): at 05:29

## 2019-09-13 RX ADMIN — Medication 975 MILLIGRAM(S): at 05:30

## 2019-09-13 RX ADMIN — Medication 975 MILLIGRAM(S): at 06:01

## 2019-09-13 RX ADMIN — ENOXAPARIN SODIUM 40 MILLIGRAM(S): 100 INJECTION SUBCUTANEOUS at 09:27

## 2019-09-13 NOTE — DISCHARGE NOTE NURSING/CASE MANAGEMENT/SOCIAL WORK - PATIENT PORTAL LINK FT
You can access the FollowMyHealth Patient Portal offered by [facility] by registering at the following website: [email or web address]. By joining [employer]’s FollowMyHealth portal, you will also be able to view your health information using other applications (apps) compatible with our system.

## 2019-09-13 NOTE — PROGRESS NOTE ADULT - SUBJECTIVE AND OBJECTIVE BOX
TRAUMA SURGERY DAILY PROGRESS NOTE:       Interval: Dc'd metoprolol,     SUBJECTIVE:     Patient feels well. Pain well controlled.  Denies chest pain, shortness of breath, nausea, vomiting, fever chills.     OBJECTIVE:    MEDICATIONS  (STANDING):  acetaminophen   Tablet .. 975 milliGRAM(s) Oral every 6 hours  calcium carbonate 1250 mG  + Vitamin D (OsCal 500 + D) 1 Tablet(s) Oral three times a day  cholecalciferol 1000 Unit(s) Oral daily  docusate sodium 100 milliGRAM(s) Oral three times a day  enoxaparin Injectable 40 milliGRAM(s) SubCutaneous every 24 hours  influenza   Vaccine 0.5 milliLiter(s) IntraMuscular once  levothyroxine 75 MICROGram(s) Oral daily  lidocaine   Patch 1 Patch Transdermal every 24 hours  melatonin 3 milliGRAM(s) Oral at bedtime  OLANZapine 5 milliGRAM(s) Oral <User Schedule>  pantoprazole    Tablet 40 milliGRAM(s) Oral before breakfast  senna 2 Tablet(s) Oral at bedtime  sertraline 100 milliGRAM(s) Oral daily    MEDICATIONS  (PRN):  ALPRAZolam 0.25 milliGRAM(s) Oral daily PRN anxiety  oxyCODONE    IR 5 milliGRAM(s) Oral every 4 hours PRN Moderate Pain (4 - 6)  oxyCODONE    IR 10 milliGRAM(s) Oral every 4 hours PRN Severe Pain (7 - 10)      Vital Signs Last 24 Hrs  T(C): 36.5 (13 Sep 2019 09:40), Max: 36.8 (12 Sep 2019 12:10)  T(F): 97.7 (13 Sep 2019 09:40), Max: 98.2 (12 Sep 2019 12:10)  HR: 67 (13 Sep 2019 09:40) (61 - 79)  BP: 115/68 (13 Sep 2019 09:40) (107/60 - 120/72)  BP(mean): --  RR: 18 (13 Sep 2019 09:40) (18 - 18)  SpO2: 95% (13 Sep 2019 09:40) (94% - 96%)      I&O's Detail    12 Sep 2019 07:01  -  13 Sep 2019 07:00  --------------------------------------------------------  IN:    Oral Fluid: 1320 mL  Total IN: 1320 mL    OUT:    Voided: 750 mL  Total OUT: 750 mL    Total NET: 570 mL            Daily     Daily           LABS:                        9.4    6.29  )-----------( 248      ( 13 Sep 2019 10:15 )             28.7     09-13    143  |  106  |  21  ----------------------------<  98  3.7   |  24  |  0.78    Ca    8.9      13 Sep 2019 07:00  Phos  3.2     09-13  Mg     1.9     09-13      PT/INR - ( 13 Sep 2019 09:44 )   PT: 16.0 sec;   INR: 1.38 ratio         PTT - ( 13 Sep 2019 09:44 )  PTT:31.0 sec              PHYSICAL EXAM:  Constitutional: well developed, well nourished, NAD  ENMT: normal facies, symmetric  Respiratory: Normal respiratory effort   Gastrointestinal: abdomen soft, nontender, nondistended. No obvious masses.  Extremities:   Skin:  Musculoskeletal: equal strength bilateral upper and lower extremities  Psychiatric: oriented x 3; appropriate TRAUMA SURGERY DAILY PROGRESS NOTE:       Interval: Medicine consulted for orthostatic hypotension. Dc'd metoprolol in AM. Repeat orthostatics WNL -> pt to chair. No acute events. Coumadin dosed.     SUBJECTIVE:     Patient feels well. Pain well controlled.  Denies chest pain, shortness of breath, nausea, vomiting, fever chills.     OBJECTIVE:    MEDICATIONS  (STANDING):  acetaminophen   Tablet .. 975 milliGRAM(s) Oral every 6 hours  calcium carbonate 1250 mG  + Vitamin D (OsCal 500 + D) 1 Tablet(s) Oral three times a day  cholecalciferol 1000 Unit(s) Oral daily  docusate sodium 100 milliGRAM(s) Oral three times a day  enoxaparin Injectable 40 milliGRAM(s) SubCutaneous every 24 hours  influenza   Vaccine 0.5 milliLiter(s) IntraMuscular once  levothyroxine 75 MICROGram(s) Oral daily  lidocaine   Patch 1 Patch Transdermal every 24 hours  melatonin 3 milliGRAM(s) Oral at bedtime  OLANZapine 5 milliGRAM(s) Oral <User Schedule>  pantoprazole    Tablet 40 milliGRAM(s) Oral before breakfast  senna 2 Tablet(s) Oral at bedtime  sertraline 100 milliGRAM(s) Oral daily    MEDICATIONS  (PRN):  ALPRAZolam 0.25 milliGRAM(s) Oral daily PRN anxiety  oxyCODONE    IR 5 milliGRAM(s) Oral every 4 hours PRN Moderate Pain (4 - 6)  oxyCODONE    IR 10 milliGRAM(s) Oral every 4 hours PRN Severe Pain (7 - 10)      Vital Signs Last 24 Hrs  T(C): 36.5 (13 Sep 2019 09:40), Max: 36.8 (12 Sep 2019 12:10)  T(F): 97.7 (13 Sep 2019 09:40), Max: 98.2 (12 Sep 2019 12:10)  HR: 67 (13 Sep 2019 09:40) (61 - 79)  BP: 115/68 (13 Sep 2019 09:40) (107/60 - 120/72)  BP(mean): --  RR: 18 (13 Sep 2019 09:40) (18 - 18)  SpO2: 95% (13 Sep 2019 09:40) (94% - 96%)      I&O's Detail    12 Sep 2019 07:01  -  13 Sep 2019 07:00  --------------------------------------------------------  IN:    Oral Fluid: 1320 mL  Total IN: 1320 mL    OUT:    Voided: 750 mL  Total OUT: 750 mL    Total NET: 570 mL            Daily     Daily           LABS:                        9.4    6.29  )-----------( 248      ( 13 Sep 2019 10:15 )             28.7     09-13    143  |  106  |  21  ----------------------------<  98  3.7   |  24  |  0.78    Ca    8.9      13 Sep 2019 07:00  Phos  3.2     09-13  Mg     1.9     09-13      PT/INR - ( 13 Sep 2019 09:44 )   PT: 16.0 sec;   INR: 1.38 ratio         PTT - ( 13 Sep 2019 09:44 )  PTT:31.0 sec        General Appearance: NAD  Neck: Supple  Chest: non-labored breathing, no respiratory distress  CV: Pulse regular presently  Abdomen: Soft, non-tender, non-distended  MSK: minimal right flank tenderness, Pelvic pain with ROM  Extremities: warm and well perfused

## 2019-09-13 NOTE — PROGRESS NOTE ADULT - PROBLEM SELECTOR PLAN 6
S/p reversal of supratherapeutic INR  C/w dosing of coumadin tonight  Pt reports she takes metoprolol as needed when she feels palpitations  Orthostatics negative  Hold leyda

## 2019-09-13 NOTE — PROGRESS NOTE ADULT - ASSESSMENT
85 y/o F with a history of Afib on Coumadin, hypothyroid, Takotsubo cardiomyopathy, pacemaker, who presents after a fall found to have right 12th rib fracture and right superior pubic rami fracture with adjacent hematoma. SICU consulted for hemorrhage watch and INR reversal given acute drop in H/H and persistently elevated INR -> H&H stable.    -pain control   -f/u am labs   -regular diet  -OOB/IS   -DVT ppx   -continue home meds  -f/u orthostatics   -f/u INR, c/w coumadin  -Plan for discharge today: [facility]     ACS [id number]

## 2019-09-13 NOTE — DISCHARGE NOTE NURSING/CASE MANAGEMENT/SOCIAL WORK - NSDCFUADDAPPT_GEN_ALL_CORE_FT
Dr Sy Chappell - Your PMD- Please call for a follow up appointment upon discharge regarding your recent fall, injury and hospitalization. We recommend Ca/Vit D and Outpt osteoporosis workup.     Please follow up with your Trauma Doctor Dr Thompson only if needed at [address] [place] [address] [address] [place] [address], phone #[phone].    You may use Salanpas 1% lidocaine patches over the counter for topical pain relief.

## 2019-09-13 NOTE — PROGRESS NOTE ADULT - SUBJECTIVE AND OBJECTIVE BOX
Patient is a 84y old  Female who presents with a chief complaint of s/p fall (13 Sep 2019 10:37)      SUBJECTIVE / OVERNIGHT EVENTS: feels better, left sided axillary pain better, had bm, no cp, sob     MEDICATIONS  (STANDING):  acetaminophen   Tablet .. 975 milliGRAM(s) Oral every 6 hours  calcium carbonate 1250 mG  + Vitamin D (OsCal 500 + D) 1 Tablet(s) Oral three times a day  cholecalciferol 1000 Unit(s) Oral daily  docusate sodium 100 milliGRAM(s) Oral three times a day  enoxaparin Injectable 40 milliGRAM(s) SubCutaneous every 24 hours  influenza   Vaccine 0.5 milliLiter(s) IntraMuscular once  levothyroxine 75 MICROGram(s) Oral daily  lidocaine   Patch 1 Patch Transdermal every 24 hours  melatonin 3 milliGRAM(s) Oral at bedtime  OLANZapine 5 milliGRAM(s) Oral <User Schedule>  pantoprazole    Tablet 40 milliGRAM(s) Oral before breakfast  senna 2 Tablet(s) Oral at bedtime  sertraline 100 milliGRAM(s) Oral daily    MEDICATIONS  (PRN):  ALPRAZolam 0.25 milliGRAM(s) Oral daily PRN anxiety  oxyCODONE    IR 5 milliGRAM(s) Oral every 4 hours PRN Moderate Pain (4 - 6)  oxyCODONE    IR 10 milliGRAM(s) Oral every 4 hours PRN Severe Pain (7 - 10)        CAPILLARY BLOOD GLUCOSE        I&O's Summary    12 Sep 2019 07:01  -  13 Sep 2019 07:00  --------------------------------------------------------  IN: 1320 mL / OUT: 750 mL / NET: 570 mL        PHYSICAL EXAM:  GENERAL: NAD, well-developed  HEAD:  Atraumatic, Normocephalic  EYES: conjunctiva and sclera clear  NECK: No JVD  CHEST/LUNG: left sided decreased breath sounds ; No wheeze  HEART: Regular rate and rhythm; S1S2  ABDOMEN: Soft, Nontender, Nondistended; Bowel sounds present  EXTREMITIES:  2+ Peripheral Pulses, No clubbing, cyanosis, or edema  PSYCH: AAOx3    LABS:                        9.4    6.29  )-----------( 248      ( 13 Sep 2019 10:15 )             28.7     09-13    143  |  106  |  21  ----------------------------<  98  3.7   |  24  |  0.78    Ca    8.9      13 Sep 2019 07:00  Phos  3.2     09-13  Mg     1.9     09-13      PT/INR - ( 13 Sep 2019 09:44 )   PT: 16.0 sec;   INR: 1.38 ratio         PTT - ( 13 Sep 2019 09:44 )  PTT:31.0 sec          RADIOLOGY & ADDITIONAL TESTS:    Imaging Personally Reviewed:    Consultant(s) Notes Reviewed:  sx    Care Discussed with Consultants/Other Providers: sx

## 2019-09-13 NOTE — PROGRESS NOTE ADULT - PROBLEM SELECTOR PLAN 10
PMD Dr Sy Chappell   [facility] [id number] xanax 0.25mg dispensed 9/4 30 day supply, 90 days  Meds and hx verified with cardiologist office Dr. Raines [phone]  tylenol, astelin 0.1% nasal spray, biotin 300, prolia q6 months, colace, synthroid 75mcg, antivert 25mg tid prn, lopressor 25mg qd prn, zyprexa 10mg 3x/wk, protonix 40mg, zantac 300mg, zoloft 100mg, vit d 5000u, coumadin

## 2019-10-01 PROCEDURE — 73502 X-RAY EXAM HIP UNI 2-3 VIEWS: CPT

## 2019-10-01 PROCEDURE — 86901 BLOOD TYPING SEROLOGIC RH(D): CPT

## 2019-10-01 PROCEDURE — 84295 ASSAY OF SERUM SODIUM: CPT

## 2019-10-01 PROCEDURE — 97161 PT EVAL LOW COMPLEX 20 MIN: CPT

## 2019-10-01 PROCEDURE — 84100 ASSAY OF PHOSPHORUS: CPT

## 2019-10-01 PROCEDURE — 82565 ASSAY OF CREATININE: CPT

## 2019-10-01 PROCEDURE — 71275 CT ANGIOGRAPHY CHEST: CPT

## 2019-10-01 PROCEDURE — 73110 X-RAY EXAM OF WRIST: CPT

## 2019-10-01 PROCEDURE — 83735 ASSAY OF MAGNESIUM: CPT

## 2019-10-01 PROCEDURE — 86900 BLOOD TYPING SEROLOGIC ABO: CPT

## 2019-10-01 PROCEDURE — 85610 PROTHROMBIN TIME: CPT

## 2019-10-01 PROCEDURE — 85027 COMPLETE CBC AUTOMATED: CPT

## 2019-10-01 PROCEDURE — 73090 X-RAY EXAM OF FOREARM: CPT

## 2019-10-01 PROCEDURE — 83605 ASSAY OF LACTIC ACID: CPT

## 2019-10-01 PROCEDURE — 73070 X-RAY EXAM OF ELBOW: CPT

## 2019-10-01 PROCEDURE — 97110 THERAPEUTIC EXERCISES: CPT

## 2019-10-01 PROCEDURE — 72192 CT PELVIS W/O DYE: CPT

## 2019-10-01 PROCEDURE — 80053 COMPREHEN METABOLIC PANEL: CPT

## 2019-10-01 PROCEDURE — 97116 GAIT TRAINING THERAPY: CPT

## 2019-10-01 PROCEDURE — 81003 URINALYSIS AUTO W/O SCOPE: CPT

## 2019-10-01 PROCEDURE — 76377 3D RENDER W/INTRP POSTPROCES: CPT

## 2019-10-01 PROCEDURE — 82435 ASSAY OF BLOOD CHLORIDE: CPT

## 2019-10-01 PROCEDURE — 82947 ASSAY GLUCOSE BLOOD QUANT: CPT

## 2019-10-01 PROCEDURE — 97530 THERAPEUTIC ACTIVITIES: CPT

## 2019-10-01 PROCEDURE — 80048 BASIC METABOLIC PNL TOTAL CA: CPT

## 2019-10-01 PROCEDURE — 71045 X-RAY EXAM CHEST 1 VIEW: CPT

## 2019-10-01 PROCEDURE — 86850 RBC ANTIBODY SCREEN: CPT

## 2019-10-01 PROCEDURE — 82803 BLOOD GASES ANY COMBINATION: CPT

## 2019-10-01 PROCEDURE — 74174 CTA ABD&PLVS W/CONTRAST: CPT

## 2019-10-01 PROCEDURE — 85730 THROMBOPLASTIN TIME PARTIAL: CPT

## 2019-10-01 PROCEDURE — 84132 ASSAY OF SERUM POTASSIUM: CPT

## 2019-10-01 PROCEDURE — 93005 ELECTROCARDIOGRAM TRACING: CPT

## 2019-10-01 PROCEDURE — 85014 HEMATOCRIT: CPT

## 2019-10-01 PROCEDURE — 82330 ASSAY OF CALCIUM: CPT

## 2019-10-01 PROCEDURE — 99285 EMERGENCY DEPT VISIT HI MDM: CPT

## 2019-10-01 PROCEDURE — 70450 CT HEAD/BRAIN W/O DYE: CPT

## 2019-10-02 ENCOUNTER — APPOINTMENT (OUTPATIENT)
Dept: ORTHOPEDIC SURGERY | Facility: CLINIC | Age: 84
End: 2019-10-02
Payer: MEDICARE

## 2019-10-02 VITALS — BODY MASS INDEX: 25.83 KG/M2 | WEIGHT: 155 LBS | HEIGHT: 65 IN

## 2019-10-02 PROCEDURE — 72170 X-RAY EXAM OF PELVIS: CPT

## 2019-10-02 PROCEDURE — 99213 OFFICE O/P EST LOW 20 MIN: CPT

## 2019-11-13 ENCOUNTER — APPOINTMENT (OUTPATIENT)
Dept: ORTHOPEDIC SURGERY | Facility: CLINIC | Age: 84
End: 2019-11-13
Payer: MEDICARE

## 2019-11-13 PROCEDURE — 99213 OFFICE O/P EST LOW 20 MIN: CPT

## 2019-11-13 PROCEDURE — 72170 X-RAY EXAM OF PELVIS: CPT

## 2019-11-22 ENCOUNTER — EMERGENCY (EMERGENCY)
Facility: HOSPITAL | Age: 84
LOS: 1 days | Discharge: HOME | End: 2019-11-22
Attending: EMERGENCY MEDICINE
Payer: MEDICARE

## 2019-11-22 VITALS
DIASTOLIC BLOOD PRESSURE: 82 MMHG | OXYGEN SATURATION: 99 % | TEMPERATURE: 98 F | SYSTOLIC BLOOD PRESSURE: 141 MMHG | RESPIRATION RATE: 20 BRPM | HEART RATE: 85 BPM

## 2019-11-22 VITALS
OXYGEN SATURATION: 99 % | TEMPERATURE: 98 F | HEART RATE: 87 BPM | SYSTOLIC BLOOD PRESSURE: 149 MMHG | WEIGHT: 154.98 LBS | RESPIRATION RATE: 20 BRPM | HEIGHT: 65 IN | DIASTOLIC BLOOD PRESSURE: 79 MMHG

## 2019-11-22 DIAGNOSIS — Z95.0 PRESENCE OF CARDIAC PACEMAKER: Chronic | ICD-10-CM

## 2019-11-22 DIAGNOSIS — Z90.89 ACQUIRED ABSENCE OF OTHER ORGANS: Chronic | ICD-10-CM

## 2019-11-22 DIAGNOSIS — Z90.49 ACQUIRED ABSENCE OF OTHER SPECIFIED PARTS OF DIGESTIVE TRACT: Chronic | ICD-10-CM

## 2019-11-22 DIAGNOSIS — Z41.9 ENCOUNTER FOR PROCEDURE FOR PURPOSES OTHER THAN REMEDYING HEALTH STATE, UNSPECIFIED: Chronic | ICD-10-CM

## 2019-11-22 LAB
ALBUMIN SERPL ELPH-MCNC: 4.4 G/DL — SIGNIFICANT CHANGE UP (ref 3.3–5)
ALP SERPL-CCNC: 254 U/L — HIGH (ref 40–120)
ALT FLD-CCNC: 29 U/L — SIGNIFICANT CHANGE UP (ref 10–45)
ANION GAP SERPL CALC-SCNC: 15 MMOL/L — SIGNIFICANT CHANGE UP (ref 5–17)
AST SERPL-CCNC: 31 U/L — SIGNIFICANT CHANGE UP (ref 10–40)
BASOPHILS # BLD AUTO: 0.06 K/UL — SIGNIFICANT CHANGE UP (ref 0–0.2)
BASOPHILS NFR BLD AUTO: 0.6 % — SIGNIFICANT CHANGE UP (ref 0–2)
BILIRUB SERPL-MCNC: 0.4 MG/DL — SIGNIFICANT CHANGE UP (ref 0.2–1.2)
BUN SERPL-MCNC: 18 MG/DL — SIGNIFICANT CHANGE UP (ref 7–23)
CALCIUM SERPL-MCNC: 9.1 MG/DL — SIGNIFICANT CHANGE UP (ref 8.4–10.5)
CHLORIDE SERPL-SCNC: 104 MMOL/L — SIGNIFICANT CHANGE UP (ref 96–108)
CO2 SERPL-SCNC: 21 MMOL/L — LOW (ref 22–31)
CREAT SERPL-MCNC: 0.69 MG/DL — SIGNIFICANT CHANGE UP (ref 0.5–1.3)
EOSINOPHIL # BLD AUTO: 0.08 K/UL — SIGNIFICANT CHANGE UP (ref 0–0.5)
EOSINOPHIL NFR BLD AUTO: 0.8 % — SIGNIFICANT CHANGE UP (ref 0–6)
GLUCOSE SERPL-MCNC: 103 MG/DL — HIGH (ref 70–99)
HCT VFR BLD CALC: 41.3 % — SIGNIFICANT CHANGE UP (ref 34.5–45)
HGB BLD-MCNC: 13.2 G/DL — SIGNIFICANT CHANGE UP (ref 11.5–15.5)
IMM GRANULOCYTES NFR BLD AUTO: 0.4 % — SIGNIFICANT CHANGE UP (ref 0–1.5)
LYMPHOCYTES # BLD AUTO: 1.37 K/UL — SIGNIFICANT CHANGE UP (ref 1–3.3)
LYMPHOCYTES # BLD AUTO: 13.3 % — SIGNIFICANT CHANGE UP (ref 13–44)
MCHC RBC-ENTMCNC: 28.4 PG — SIGNIFICANT CHANGE UP (ref 27–34)
MCHC RBC-ENTMCNC: 32 GM/DL — SIGNIFICANT CHANGE UP (ref 32–36)
MCV RBC AUTO: 89 FL — SIGNIFICANT CHANGE UP (ref 80–100)
MONOCYTES # BLD AUTO: 0.6 K/UL — SIGNIFICANT CHANGE UP (ref 0–0.9)
MONOCYTES NFR BLD AUTO: 5.8 % — SIGNIFICANT CHANGE UP (ref 2–14)
NEUTROPHILS # BLD AUTO: 8.14 K/UL — HIGH (ref 1.8–7.4)
NEUTROPHILS NFR BLD AUTO: 79.1 % — HIGH (ref 43–77)
NRBC # BLD: 0 /100 WBCS — SIGNIFICANT CHANGE UP (ref 0–0)
PLATELET # BLD AUTO: 291 K/UL — SIGNIFICANT CHANGE UP (ref 150–400)
POTASSIUM SERPL-MCNC: 4.2 MMOL/L — SIGNIFICANT CHANGE UP (ref 3.5–5.3)
POTASSIUM SERPL-SCNC: 4.2 MMOL/L — SIGNIFICANT CHANGE UP (ref 3.5–5.3)
PROT SERPL-MCNC: 8 G/DL — SIGNIFICANT CHANGE UP (ref 6–8.3)
RBC # BLD: 4.64 M/UL — SIGNIFICANT CHANGE UP (ref 3.8–5.2)
RBC # FLD: 16 % — HIGH (ref 10.3–14.5)
SODIUM SERPL-SCNC: 140 MMOL/L — SIGNIFICANT CHANGE UP (ref 135–145)
WBC # BLD: 10.29 K/UL — SIGNIFICANT CHANGE UP (ref 3.8–10.5)
WBC # FLD AUTO: 10.29 K/UL — SIGNIFICANT CHANGE UP (ref 3.8–10.5)

## 2019-11-22 PROCEDURE — 73130 X-RAY EXAM OF HAND: CPT

## 2019-11-22 PROCEDURE — 70486 CT MAXILLOFACIAL W/O DYE: CPT

## 2019-11-22 PROCEDURE — 85027 COMPLETE CBC AUTOMATED: CPT

## 2019-11-22 PROCEDURE — 72125 CT NECK SPINE W/O DYE: CPT | Mod: 26

## 2019-11-22 PROCEDURE — 90471 IMMUNIZATION ADMIN: CPT

## 2019-11-22 PROCEDURE — 70486 CT MAXILLOFACIAL W/O DYE: CPT | Mod: 26

## 2019-11-22 PROCEDURE — 99284 EMERGENCY DEPT VISIT MOD MDM: CPT | Mod: 25

## 2019-11-22 PROCEDURE — 70450 CT HEAD/BRAIN W/O DYE: CPT | Mod: 26

## 2019-11-22 PROCEDURE — 99284 EMERGENCY DEPT VISIT MOD MDM: CPT | Mod: GC

## 2019-11-22 PROCEDURE — 90715 TDAP VACCINE 7 YRS/> IM: CPT

## 2019-11-22 PROCEDURE — 72170 X-RAY EXAM OF PELVIS: CPT

## 2019-11-22 PROCEDURE — 72170 X-RAY EXAM OF PELVIS: CPT | Mod: 26

## 2019-11-22 PROCEDURE — 70450 CT HEAD/BRAIN W/O DYE: CPT

## 2019-11-22 PROCEDURE — 97161 PT EVAL LOW COMPLEX 20 MIN: CPT

## 2019-11-22 PROCEDURE — 72125 CT NECK SPINE W/O DYE: CPT

## 2019-11-22 PROCEDURE — 76377 3D RENDER W/INTRP POSTPROCES: CPT

## 2019-11-22 PROCEDURE — 76377 3D RENDER W/INTRP POSTPROCES: CPT | Mod: 26

## 2019-11-22 PROCEDURE — 80053 COMPREHEN METABOLIC PANEL: CPT

## 2019-11-22 PROCEDURE — 73130 X-RAY EXAM OF HAND: CPT | Mod: 26,RT

## 2019-11-22 RX ORDER — TETANUS TOXOID, REDUCED DIPHTHERIA TOXOID AND ACELLULAR PERTUSSIS VACCINE, ADSORBED 5; 2.5; 8; 8; 2.5 [IU]/.5ML; [IU]/.5ML; UG/.5ML; UG/.5ML; UG/.5ML
0.5 SUSPENSION INTRAMUSCULAR ONCE
Refills: 0 | Status: COMPLETED | OUTPATIENT
Start: 2019-11-22 | End: 2019-11-22

## 2019-11-22 RX ORDER — ONDANSETRON 8 MG/1
4 TABLET, FILM COATED ORAL ONCE
Refills: 0 | Status: DISCONTINUED | OUTPATIENT
Start: 2019-11-22 | End: 2019-11-22

## 2019-11-22 RX ORDER — ACETAMINOPHEN 500 MG
975 TABLET ORAL ONCE
Refills: 0 | Status: COMPLETED | OUTPATIENT
Start: 2019-11-22 | End: 2019-11-22

## 2019-11-22 RX ADMIN — Medication 975 MILLIGRAM(S): at 18:32

## 2019-11-22 RX ADMIN — TETANUS TOXOID, REDUCED DIPHTHERIA TOXOID AND ACELLULAR PERTUSSIS VACCINE, ADSORBED 0.5 MILLILITER(S): 5; 2.5; 8; 8; 2.5 SUSPENSION INTRAMUSCULAR at 18:32

## 2019-11-22 NOTE — ED PROVIDER NOTE - CARE PLAN
Principal Discharge DX:	Eyebrow laceration, right, initial encounter  Secondary Diagnosis:	Right hand pain  Secondary Diagnosis:	Fall

## 2019-11-22 NOTE — ED PROVIDER NOTE - NSFOLLOWUPINSTRUCTIONS_ED_ALL_ED_FT
FOLLOW UP WITH PHYSICAL THERAPY AS DIRECTED BY THE     YOU CAN GET THE GLUED AREA WET, IT IS OK TO SHOWER, BUT AVOID VIGOROUS SCRUBBING OF THE AREA    YOU MIGHT HAVE A MILD CONCUSSION, BUT THIS IS NOT GOING TO CAUSE ANY RESIDUAL PROBLEMS, YOU MIGHT HAVE A HEADACHE OR SOME NAUSEA TOMORROW, THIS IS NORMAL    IF YOU HAVE SEVERE NAUSEA AND VOMITING, RETURN TO THE EMERGENCY ROOM FOR A REPEAT CT SCAN

## 2019-11-22 NOTE — ED PROVIDER NOTE - PHYSICAL EXAMINATION
SKIN: 1 cm lac over R eye brow, small skin tear of dorsal surface of R hand, ecchymosis and swelling over R cheekbone    MSK: pain to palpation over R cheekbone, pain to palpation over 2-4 metacarpal bones of R hand

## 2019-11-22 NOTE — ED PROVIDER NOTE - SHIFT CHANGE DETAILS
Attending MD Wiggins: freq falls, today mechanical fall, CTH, CTMF, labs, R hand XR for superficial abrasion, TDAP today, Tylenol, previous R sup prev ashleigh fx (old, XR today for comparison), PT eval

## 2019-11-22 NOTE — ED ADULT NURSE NOTE - NS ED NURSE RECORD ANOTHER VITAL SIGN
PROCEDURE:   OB ultrasound for biophysical profile 

 

CLINICAL INDICATION:   Post dates

 

TECHNIQUE:   Multiple sonographic images of the pelvis were obtained.  Transabdominal views of the g
ravid uterus are available for review.  The images were reviewed on a PACS workstation.  

 

COMPARISON:   None 

 

FINDINGS:

 

Fetal breathing movement = 2/2

Fetal tone = 2/2

Fetal motion = 2/2

RAMIRO = 2/2

 

RAMIRO = 11.3 cm

Single live intrauterine pregnancy with fetal cardiac activity of 136 bpm.  Fetal position is cephal
ic.  The placenta is left lateral.

 

IMPRESSION:

 

1.  Single live intrauterine gestation.  

2.  Biophysical profile = 8/8.

3.  RAMIRO = 11.3 cm. 

 

 

RPTAT: HH

_____________________________________________ 

.Annita Martinez MD, MD           Date    Time 

Electronically viewed and signed by .Annita Martinez MD, MD on 07/20/2017 10:37 

 

D:  07/20/2017 10:37  T:  07/20/2017 10:37

.G/
Yes

## 2019-11-22 NOTE — ED PROVIDER NOTE - OBJECTIVE STATEMENT
84 year old female, on coumadin for atrial fibrillation, lives in assisted living, recently repaired R superior rami fx, p/w head trauma s/p fall. Pt reports mechanical slip/fall that occurred while walking today, fell forward onto face, sustained cut over R eyebrow, bruising over R cheek, complaining of nausea and pain in R hand. Pt has small skin tear over surface of R hand, reports falling onto hand as well. Denies LOC, denies

## 2019-11-22 NOTE — ED PROVIDER NOTE - PROGRESS NOTE DETAILS
Cedric, pgy3: [id number] spectra of evening PT Cedric, pgy3: [id number] spectra of evening PT, contacted PT for eval and tx, will recontact after CT and XR results are back, as they cannot fully staff the consult until pt is radiologically evaluated Cedric, pgy3: all imaging WNL, no acute fx or bleeds. PT made aware, will eval pt in ER Attending MD Wiggins: CTs and XR reviewed and nonactionable, pending PT Attending MD Wiggins: Patient reassessed, eager to leave.  Seen by PT, recommended home PT.  Seen by SW for same.  Patient stable for discharge. Follow up instructions given, importance of follow up emphasized, return to ED parameters reviewed and patient verbalized understanding.  All questions answered, all concerns addressed. Cedric, pgy3: pt indicates she has an old PT script at home, in this setting will not send additional PT script given that our PT cleared her for home PT. Pt refusing additional wound cleaning here as it "is too painful, and I'm not a good patient with these things"

## 2019-11-22 NOTE — CHART NOTE - NSCHARTNOTEFT_GEN_A_CORE
Emergency Room :  [facility] received a referral from the medical team for discharge planning. Patient is an 84 year old single  male presented to the ED after a fall at a medical facility.  LMSW introduced herself to the patient and she verbalized understanding the role of the . Patient is alert and oriented x 4 spheres.  Demographic information was reviewed and confirmed.  Patient resides at the [facility] in [place].  Patient is independent in all ADLS and ambulates with a straight cane.  Patient was evaluated by PT and recommended for home physical therapy.  Patient reports she receives physical therapy at the [facility] and she also has a prescription for additional sessions.  Patient noted her car is at the medical facility but she will take car service home. Update provided to the medical team.  Per medical patient is medically cleared for discharge.  Patient declined to identify a primary caregiver.  LMSW will follow up as needed.

## 2019-11-22 NOTE — PHYSICAL THERAPY INITIAL EVALUATION ADULT - ADDITIONAL COMMENTS
As per pt, pt resides at independent living facility. Pt was independent w/ all ADLs and ambulated w/ straight cane, still driving. Pt owns rollator

## 2019-11-22 NOTE — PHYSICAL THERAPY INITIAL EVALUATION ADULT - MANUAL MUSCLE TESTING RESULTS, REHAB EVAL
grossly assessed due to/Strength is grossly at least 4/5 throughout except R hip flexion grossly 3+/5

## 2019-11-22 NOTE — ED ADULT NURSE NOTE - NSIMPLEMENTINTERV_GEN_ALL_ED
Implemented All Fall Risk Interventions:  [place] to call system. Call bell, personal items and telephone within reach. Instruct patient to call for assistance. Room bathroom lighting operational. Non-slip footwear when patient is off stretcher. Physically safe environment: no spills, clutter or unnecessary equipment. Stretcher in lowest position, wheels locked, appropriate side rails in place. Provide visual cue, wrist band, yellow gown, etc. Monitor gait and stability. Monitor for mental status changes and reorient to person, place, and time. Review medications for side effects contributing to fall risk. Reinforce activity limits and safety measures with patient and family.

## 2019-11-22 NOTE — PHYSICAL THERAPY INITIAL EVALUATION ADULT - PERTINENT HX OF CURRENT PROBLEM, REHAB EVAL
Pt is a 84 year old female, on coumadin for atrial fibrillation, lives in independent living, recently repaired R superior rami fx, p/w head trauma s/p fall. Pt reports mechanical slip/fall that occurred while walking today, fell forward onto face, sustained cut over R eyebrow, bruising over R cheek, complaining of nausea and pain in R hand.X-ray pelvis/R hand (-) for fx. CTH/C-spine (-)

## 2019-11-22 NOTE — ED ADULT NURSE NOTE - OBJECTIVE STATEMENT
Patient presents to Ed with c/o fall. Patient with hx of Afib on Coumadin reports she slipped and fell onto her face today  and also injured her rt wrist. Patient with laceration above rt eyebrow and c/o pain to her rt wrist. Patient unable to recall  her last Tetanus. Patient denies chest pain or sob, no nausea vomiting fever chills or dizziness, head with no active bleeding.

## 2019-11-22 NOTE — ED PROVIDER NOTE - ATTENDING CONTRIBUTION TO CARE
pt is a 83 y/o female 84 year old female, on coumadin for atrial fibrillation, lives in assisted living with mechanical fall with r facial ecchymosis noted, eomi, no visual distubance, no loc, non focal neuro exam, ct head and mf ordered, no other major injuries, plain films pelvis.

## 2019-11-22 NOTE — ED PROVIDER NOTE - PATIENT PORTAL LINK FT
You can access the FollowMyHealth Patient Portal offered by [facility] by registering at the following website: [email or web address]. By joining [facility]’s FollowMyHealth portal, you will also be able to view your health information using other applications (apps) compatible with our system.

## 2020-01-15 ENCOUNTER — APPOINTMENT (OUTPATIENT)
Dept: ORTHOPEDIC SURGERY | Facility: CLINIC | Age: 85
End: 2020-01-15
Payer: MEDICARE

## 2020-01-15 DIAGNOSIS — S32.591D OTHER SPECIFIED FRACTURE OF RIGHT PUBIS, SUBSEQUENT ENCOUNTER FOR FRACTURE WITH ROUTINE HEALING: ICD-10-CM

## 2020-01-15 PROCEDURE — 99213 OFFICE O/P EST LOW 20 MIN: CPT

## 2020-01-15 PROCEDURE — 72170 X-RAY EXAM OF PELVIS: CPT

## 2020-01-17 PROBLEM — S32.591D: Status: ACTIVE | Noted: 2019-10-01

## 2020-01-20 ENCOUNTER — OUTPATIENT (OUTPATIENT)
Dept: OUTPATIENT SERVICES | Facility: HOSPITAL | Age: 85
LOS: 1 days | End: 2020-01-20
Payer: MEDICARE

## 2020-01-20 ENCOUNTER — APPOINTMENT (OUTPATIENT)
Dept: CT IMAGING | Facility: CLINIC | Age: 85
End: 2020-01-20
Payer: MEDICARE

## 2020-01-20 DIAGNOSIS — Z00.8 ENCOUNTER FOR OTHER GENERAL EXAMINATION: ICD-10-CM

## 2020-01-20 DIAGNOSIS — Z90.89 ACQUIRED ABSENCE OF OTHER ORGANS: Chronic | ICD-10-CM

## 2020-01-20 DIAGNOSIS — Z95.0 PRESENCE OF CARDIAC PACEMAKER: Chronic | ICD-10-CM

## 2020-01-20 DIAGNOSIS — Z90.49 ACQUIRED ABSENCE OF OTHER SPECIFIED PARTS OF DIGESTIVE TRACT: Chronic | ICD-10-CM

## 2020-01-20 DIAGNOSIS — Z41.9 ENCOUNTER FOR PROCEDURE FOR PURPOSES OTHER THAN REMEDYING HEALTH STATE, UNSPECIFIED: Chronic | ICD-10-CM

## 2020-01-20 PROCEDURE — 70450 CT HEAD/BRAIN W/O DYE: CPT

## 2020-01-20 PROCEDURE — 70450 CT HEAD/BRAIN W/O DYE: CPT | Mod: 26

## 2020-06-09 NOTE — ED ADULT TRIAGE NOTE - NS ED NURSE AMBULANCES
Walked pt from [address] [place] [address] to ED bed. Obtained VS. Pt wearing mask, medic wearing mask, gloves, safety glasses, and face shield.      Constanza Wolf, EMT-P  06/09/20 Octavio Haile [facility]

## 2020-10-29 ENCOUNTER — NON-APPOINTMENT (OUTPATIENT)
Age: 85
End: 2020-10-29

## 2020-10-29 DIAGNOSIS — E78.5 HYPERLIPIDEMIA, UNSPECIFIED: ICD-10-CM

## 2020-10-29 DIAGNOSIS — Z84.89 FAMILY HISTORY OF OTHER SPECIFIED CONDITIONS: ICD-10-CM

## 2020-10-29 DIAGNOSIS — Z82.0 FAMILY HISTORY OF EPILEPSY AND OTHER DISEASES OF THE NERVOUS SYSTEM: ICD-10-CM

## 2020-10-29 DIAGNOSIS — Z63.4 DISAPPEARANCE AND DEATH OF FAMILY MEMBER: ICD-10-CM

## 2020-10-29 DIAGNOSIS — Z82.3 FAMILY HISTORY OF STROKE: ICD-10-CM

## 2020-10-29 DIAGNOSIS — Z87.81 PERSONAL HISTORY OF (HEALED) TRAUMATIC FRACTURE: ICD-10-CM

## 2020-10-29 DIAGNOSIS — G96.810 INTRACRANIAL HYPOTENSION, UNSPECIFIED: ICD-10-CM

## 2020-10-29 DIAGNOSIS — K21.9 GASTRO-ESOPHAGEAL REFLUX DISEASE W/OUT ESOPHAGITIS: ICD-10-CM

## 2020-10-29 DIAGNOSIS — I48.91 UNSPECIFIED ATRIAL FIBRILLATION: ICD-10-CM

## 2020-10-29 DIAGNOSIS — M19.90 UNSPECIFIED OSTEOARTHRITIS, UNSPECIFIED SITE: ICD-10-CM

## 2020-10-29 DIAGNOSIS — Z82.5 FAMILY HISTORY OF ASTHMA AND OTHER CHRONIC LOWER RESPIRATORY DISEASES: ICD-10-CM

## 2020-10-29 RX ORDER — DENOSUMAB 60 MG/ML
INJECTION SUBCUTANEOUS
Refills: 0 | Status: ACTIVE | COMMUNITY

## 2020-10-29 RX ORDER — CHROMIUM 200 MCG
TABLET ORAL
Refills: 0 | Status: ACTIVE | COMMUNITY

## 2020-10-29 RX ORDER — OLANZAPINE 20 MG/1
TABLET, FILM COATED ORAL
Refills: 0 | Status: ACTIVE | COMMUNITY

## 2020-10-29 RX ORDER — BISMUTH SUBSALICYLATE 262 MG
TABLET,CHEWABLE ORAL
Refills: 0 | Status: ACTIVE | COMMUNITY

## 2020-10-29 RX ORDER — DOCUSATE CALCIUM 240 MG
CAPSULE ORAL
Refills: 0 | Status: ACTIVE | COMMUNITY

## 2020-10-29 RX ORDER — ACETAMINOPHEN 500 MG/1
TABLET, COATED ORAL
Refills: 0 | Status: ACTIVE | COMMUNITY

## 2020-10-29 SDOH — SOCIAL STABILITY - SOCIAL INSECURITY: DISSAPEARANCE AND DEATH OF FAMILY MEMBER: Z63.4

## 2020-11-24 NOTE — ASU DISCHARGE PLAN (ADULT/PEDIATRIC). - ASK YOUR DOCTOR ABOUT RESUMING EYE MAKE-UP
Quality 110: Preventive Care And Screening: Influenza Immunization: Influenza Immunization Administered during Influenza season Detail Level: Detailed Statement Selected

## 2020-12-07 ENCOUNTER — APPOINTMENT (OUTPATIENT)
Dept: NEUROLOGY | Facility: CLINIC | Age: 85
End: 2020-12-07
Payer: MEDICARE

## 2020-12-07 VITALS
WEIGHT: 150 LBS | DIASTOLIC BLOOD PRESSURE: 71 MMHG | HEART RATE: 93 BPM | HEIGHT: 65 IN | BODY MASS INDEX: 24.99 KG/M2 | SYSTOLIC BLOOD PRESSURE: 117 MMHG

## 2020-12-07 VITALS — TEMPERATURE: 95.7 F

## 2020-12-07 DIAGNOSIS — R51.9 HEADACHE, UNSPECIFIED: ICD-10-CM

## 2020-12-07 PROCEDURE — 99214 OFFICE O/P EST MOD 30 MIN: CPT

## 2020-12-07 RX ORDER — ANASTROZOLE TABLETS 1 MG/1
1 TABLET ORAL
Refills: 0 | Status: ACTIVE | COMMUNITY

## 2020-12-07 RX ORDER — LEVOTHYROXINE SODIUM 0.17 MG/1
TABLET ORAL
Refills: 0 | Status: DISCONTINUED | COMMUNITY
End: 2020-12-07

## 2020-12-07 RX ORDER — PANTOPRAZOLE 40 MG/1
TABLET, DELAYED RELEASE ORAL
Refills: 0 | Status: DISCONTINUED | COMMUNITY
End: 2020-12-07

## 2020-12-07 RX ORDER — RANITIDINE HYDROCHLORIDE 300 MG/1
CAPSULE ORAL
Refills: 0 | Status: DISCONTINUED | COMMUNITY
End: 2020-12-07

## 2020-12-07 RX ORDER — SERTRALINE HYDROCHLORIDE 100 MG/1
100 TABLET, FILM COATED ORAL DAILY
Qty: 90 | Refills: 3 | Status: ACTIVE | COMMUNITY

## 2020-12-07 NOTE — HISTORY OF PRESENT ILLNESS
[FreeTextEntry1] : Mrs. Rhoda Jimenes returned to the office having been last seen on January 20, 2020. She is an 85-year-old right-handed patient with subacute cognitive decline.  When last seen, there had been an abrupt deterioration in her cognitive state.  CT of the brain revealed atrophy and chronic microvascular ischemic changes.  She had a urinary tract infection which required treatment.  She improved.\par \par She is now residing in a skilled nursing facility in [place], [place] near her daughter Elizabeth.  She is having increasing cognitive difficulties.  She has poor short-term memory.  Her balance is extremely poor and she falls frequently.  She has difficulty even using a walker.  She has painful knees.  She remains for the most part continent.

## 2020-12-07 NOTE — PHYSICAL EXAM
[FreeTextEntry1] : Constitutional:  Patient was well-developed, well-nourished and in no acute distress.  She was sitting in a wheelchair.\par \par Head:  Normocephalic, atraumatic. Tympanic membranes were clear. \par \par Neck:  Supple with full range of motion. \par \par Cardiovascular:  Cardiac rhythm was regular without murmur. There were no carotid bruits. Peripheral pulses were full and symmetric. \par \par Respiratory:  Lungs were clear. \par \par Abdomen:  Soft and nontender. \par \par Spine:  Nontender. \par \par Skin:  There were no rashes. \par \par NEUROLOGICAL EXAMINATION:\par \par Mental Status: Patient was awake but slow.  She scored 22 out of 30 on Mini-Mental state examination.  She was very disoriented to the date.  She had difficulty spelling world backwards.  On a second attempt, she provided 4 letters.  She recalled 1 of 3 words.  She scored 27 out of 30 on MMSE in January 2020.\par \par Cranial Nerves: \par \par II: She could finger count bilaterally.  Pupils were surgical. Visual fields were full. Funduscopic examination was normal. \par \par III, IV, VI:  Eye movements were full without nystagmus. \par \par V: Facial sensation was intact. \par \par VII: Facial strength was normal. \par \par VIII: Hearing was diminished bilaterally.\par \par IX, X: Palatal movement was normal. Phonation was normal. \par \par XI: Sternocleidomastoids and trapezii were normal. \par \par XII: Tongue was midline and movements normal. There was no lingual atrophy or fasciculations. \par \par Motor Examination: Muscle bulk, tone and strength were normal.  There was no tremor.\par \par Sensory Examination: Vibration sense was diminished in the feet.  Pinprick was intact.\par \par Reflexes: DTRs were 1 at the biceps and absent elsewhere. \par \par Plantar Responses: Plantar responses were flexor. \par \par Coordination/Cerebellar Function: There was no dysmetria on finger to nose or heel to shin testing. \par \par Gait/Stance: She required assistance to stand.  She was very ataxic.\par

## 2020-12-07 NOTE — ASSESSMENT
[FreeTextEntry1] : Mrs. Jimenes is an 85-year-old with subacute progressive cognitive decline and ataxia.  This is at least partly due to cerebrovascular disease as noted on her 2020 CT.  I suspect that she may suffer from a neurodegenerative disorder like Alzheimer's disease.\par \par In view of her continued use of warfarin and frequent falls, I suggested an updated CT of the brain.  If unrevealing, one can consider treatment with a cholinesterase inhibitor, e.g., donepezil.  I however will defer this decision to her psychiatrist Dr. Timmons.\par \par I suggested a routine follow-up visit in 6 months.  Telephone contact will be maintained in the meantime.

## 2020-12-07 NOTE — REVIEW OF SYSTEMS
[Confused or Disoriented] : confusion [Memory Lapses or Loss] : memory loss [Difficulty Walking] : difficulty walking [Negative] : Heme/Lymph

## 2020-12-07 NOTE — CONSULT LETTER
[Dear  ___] : Dear  [unfilled], [Consult Letter:] : I had the pleasure of evaluating your patient, [unfilled]. [Please see my note below.] : Please see my note below. [Consult Closing:] : Thank you very much for allowing me to participate in the care of this patient.  If you have any questions, please do not hesitate to contact me. [Sincerely,] : Sincerely, [DrJim  ___] : Dr. HECK [FreeTextEntry3] : Jcak Jurado MD\par

## 2020-12-18 ENCOUNTER — NON-APPOINTMENT (OUTPATIENT)
Age: 85
End: 2020-12-18

## 2020-12-31 ENCOUNTER — NON-APPOINTMENT (OUTPATIENT)
Age: 85
End: 2020-12-31

## 2021-05-10 ENCOUNTER — APPOINTMENT (OUTPATIENT)
Dept: NEUROLOGY | Facility: CLINIC | Age: 86
End: 2021-05-10
Payer: MEDICARE

## 2021-05-10 VITALS
WEIGHT: 153 LBS | HEIGHT: 65 IN | HEART RATE: 94 BPM | BODY MASS INDEX: 25.49 KG/M2 | SYSTOLIC BLOOD PRESSURE: 122 MMHG | DIASTOLIC BLOOD PRESSURE: 82 MMHG

## 2021-05-10 VITALS — TEMPERATURE: 97.2 F

## 2021-05-10 DIAGNOSIS — R27.0 ATAXIA, UNSPECIFIED: ICD-10-CM

## 2021-05-10 DIAGNOSIS — R41.3 OTHER AMNESIA: ICD-10-CM

## 2021-05-10 PROCEDURE — 99213 OFFICE O/P EST LOW 20 MIN: CPT

## 2021-05-10 RX ORDER — DONEPEZIL HYDROCHLORIDE 5 MG/1
5 TABLET ORAL
Qty: 90 | Refills: 0 | Status: ACTIVE | COMMUNITY
Start: 2021-05-03

## 2021-05-10 RX ORDER — CHLORHEXIDINE GLUCONATE, 0.12% ORAL RINSE 1.2 MG/ML
0.12 SOLUTION DENTAL
Qty: 473 | Refills: 0 | Status: DISCONTINUED | COMMUNITY
Start: 2021-01-05

## 2021-05-10 RX ORDER — FAMOTIDINE 40 MG/1
40 TABLET, FILM COATED ORAL
Qty: 60 | Refills: 0 | Status: DISCONTINUED | COMMUNITY
Start: 2021-02-05

## 2021-05-10 RX ORDER — METHENAMINE HIPPURATE 1 G/1
1 TABLET ORAL
Qty: 90 | Refills: 0 | Status: DISCONTINUED | COMMUNITY
Start: 2021-02-05

## 2021-05-10 RX ORDER — SODIUM FLUORIDE 6 MG/ML
1.1 PASTE, DENTIFRICE DENTAL
Qty: 100 | Refills: 0 | Status: DISCONTINUED | COMMUNITY
Start: 2021-05-06

## 2021-05-10 NOTE — CONSULT LETTER
[Dear  ___] : Dear  [unfilled], [Consult Letter:] : I had the pleasure of evaluating your patient, [unfilled]. [Please see my note below.] : Please see my note below. [Consult Closing:] : Thank you very much for allowing me to participate in the care of this patient.  If you have any questions, please do not hesitate to contact me. [Sincerely,] : Sincerely, [FreeTextEntry3] : Jack Jurado MD\par  [DriJm  ___] : Dr. HECK

## 2021-05-10 NOTE — HISTORY OF PRESENT ILLNESS
[FreeTextEntry1] : Mrs. Rhoda Jimenes returned to the office having been last seen on December 7, 2020. She is an 86-year-old right-handed patient with subacute cognitive decline.  When last seen, experiencing subacute progressive cognitive decline and ataxia.  This was felt to be at least partly the consequence of cerebrovascular disease.  A neurodegenerative disorder like Alzheimer's disease was considered.  Because of her use of warfarin and frequent falls, she underwent a CT of the brain.  That study revealed moderate chronic microvascular ischemic change without hemorrhage.\par \par She was subsequently begun on donepezil and is tolerating 5 mg/day.  According to her daughter Elizabeth, there has been miraculous improvement in her memory, verbal skills and ambulation.  She has only mild bladder leakage.  She complains of nocturia.  She is only occasionally confused.  She ambulates with a walker.  She still has chronic knee pain.  She is living in a skilled nursing facility in [place], [place].

## 2021-05-10 NOTE — PHYSICAL EXAM
[FreeTextEntry1] : Constitutional:  Patient was well-developed, well-nourished and in no acute distress.  She was sitting in a wheelchair.\par \par Head:  Normocephalic, atraumatic. Tympanic membranes were clear. \par \par Neck:  Supple with full range of motion. \par \par Cardiovascular:  Cardiac rhythm was regular without murmur. There were no carotid bruits. Peripheral pulses were full and symmetric. \par \par Respiratory:  Lungs were clear. \par \par Abdomen:  Soft and nontender. \par \par Spine:  Nontender. \par \par Skin:  There were no rashes. \par \par NEUROLOGICAL EXAMINATION:\par \par Mental Status: Patient was awake but slow.  She scored 27 out of 30 on MMSE, a five-point improvement compared to her last visit and the same score as January 2020.\par \par Cranial Nerves: \par \par II: She could finger count bilaterally.  Pupils were surgical. Visual fields were full. Funduscopic examination was normal. \par \par III, IV, VI:  Eye movements were full without nystagmus. \par \par V: Facial sensation was intact. \par \par VII: Facial strength was normal. \par \par VIII: Hearing was diminished bilaterally.\par \par IX, X: Palatal movement was normal. Phonation was normal. \par \par XI: Sternocleidomastoids and trapezii were normal. \par \par XII: Tongue was midline and movements normal. There was no lingual atrophy or fasciculations. \par \par Motor Examination: Muscle bulk, tone and strength were normal.  There was no tremor.\par \par Sensory Examination: Vibration sense was diminished in the feet.  Pinprick was intact.\par \par Reflexes: DTRs were 1 at the biceps and absent elsewhere. \par \par Plantar Responses: Plantar responses were flexor. \par \par Coordination/Cerebellar Function: There was no dysmetria on finger to nose or heel to shin testing. \par \par Gait/Stance: He required minimal assistance to stand.  She ambulated with a slightly stooped posture, small steps and decomposed turns.\par

## 2021-05-29 NOTE — ASU PATIENT PROFILE, ADULT - NS PRO PT RIGHT SUPPORT PERSON
ASSESSMENT/PLAN:  1. Non-intractable cyclical vomiting with nausea  Estelle is a 18 year old female with cyclical vomiting that was consistently tied to her menstrual cycles. She is doing much better with the birth control pill. No noted side effects. Will continue this. Will intermittently use zofran as needed, but this is significantly less than prior.  - norgestimate-ethinyl estradiol (SPRINTEC, 28,) 0.25-35 mg-mcg per tablet; Take 1 tablet by mouth daily.  Dispense: 84 tablet; Refill: 4  - ondansetron (ZOFRAN-ODT) 4 MG disintegrating tablet; DISSOLVE ONE TABLET IN MOUTH EVERY EIGHT HOURS AS NEEDED FOR NAUSEA  Dispense: 20 tablet; Refill: 1    2. Exercise-induced asthma  She is doing well with this. ACT 22 today. Albuterol was refilled   - albuterol (PROAIR HFA;PROVENTIL HFA;VENTOLIN HFA) 90 mcg/actuation inhaler; Inhale 2 puffs every 4 (four) hours as needed for wheezing.  Dispense: 1 Inhaler; Refill: 3    3. Seasonal allergies  She has seasonal allergies that are well controlled with allegra. Continue this daily as needed.  - fexofenadine (ALLEGRA) 180 MG tablet; Take 1 tablet (180 mg total) by mouth daily.  Dispense: 90 tablet; Refill: 2  Follow up in 1 year for a well child check or sooner as needed.     There are no Patient Instructions on file for this visit.    Orders Placed This Encounter   Procedures     Meningococcal MCV4P     Order Specific Question:   Counseling provided to include answering patients questions and/or preemptively discussing the risks and benefits of all components.     Answer:   Yes     Medications Discontinued During This Encounter   Medication Reason     albuterol (PROAIR HFA;PROVENTIL HFA;VENTOLIN HFA) 90 mcg/actuation inhaler Reorder     SPRINTEC, 28, 0.25-35 mg-mcg per tablet Reorder     ondansetron (ZOFRAN-ODT) 4 MG disintegrating tablet Reorder     fexofenadine (ALLEGRA) 180 MG tablet Reorder       Return in about 1 year (around 6/14/2020).    CHIEF COMPLAINT:  Chief Complaint    Patient presents with     Medication Management       HISTORY OF PRESENT ILLNESS:  Letty is a 18 y.o. female presenting to the clinic today for a medication check. Letty has been on birth control for about 1 year. She was started due to cyclical vomiting around the time of her periods. The vomiting has significantly improved. She is not noting any other vomiting, leg pain, shortness of breath, or nausea from this.     She is doing well with her asthma. She has used the albuterol intermittently with track, but that is all. It does help for that.    REVIEW OF SYSTEMS:   Review of Symptoms: History obtained from mother and the patient.    All other systems are negative.    PFSH:      Past Medical History:   Diagnosis Date     Ankle joint pain      Ankle sprain      Contusion of skin with intact surface      Foot fracture 2006     Lumbago 03/08/2016     Warts of foot        Family History   Problem Relation Age of Onset     Allergies Mother      Breast cancer Mother 38     Allergies Father      Asthma Father      Alcohol abuse Father      Other Sister         motion sickness     Menstrual problems Sister         menorrhagia      Alcohol abuse Paternal Grandfather        Past Surgical History:   Procedure Laterality Date     DENTAL SURGERY  2007    4 [address]       TOBACCO USE:  Social History     Tobacco Use   Smoking Status Never Smoker   Smokeless Tobacco Never Used       VITALS:  Vitals:    06/14/19 1053   BP: 116/64   Temp: 98.8  F (37.1  C)   TempSrc: Oral   Weight: 133 lb 12.8 oz (60.7 kg)     Wt Readings from Last 3 Encounters:   06/14/19 133 lb 12.8 oz (60.7 kg) (73 %, Z= 0.62)*   06/26/18 132 lb 1.6 oz (59.9 kg) (75 %, Z= 0.69)*   04/21/18 132 lb (59.9 kg) (76 %, Z= 0.71)*     * Growth percentiles are based on CDC (Girls, 2-20 Years) data.     There is no height or weight on file to calculate BMI.    PHYSICAL EXAM:  Constitutional: She appears well-developed and well-nourished.   HEENT: Head:  Normocephalic.   Neck: Neck supple. No tenderness is present.   Cardiovascular: Normal rate and regular rhythm. No murmur heard.  Pulmonary/Chest: Effort normal and breath sounds normal. There is normal air entry.   Musculoskeletal: Normal range of motion. Normal strength and tone.   Neurological: She is alert. Gait normal.   Psychiatric: She has a normal mood and affect. Her speech is normal and behavior is normal.  Skin: Clear. No rashes.       Electronically signed by Tiara Arredondo MD 6/14/2019 9:45 PM     same name as above

## 2021-09-14 ENCOUNTER — APPOINTMENT (OUTPATIENT)
Dept: NEUROLOGY | Facility: CLINIC | Age: 86
End: 2021-09-14

## 2022-05-13 NOTE — ED ADULT NURSE NOTE - ED STAT RN HANDOFF DETAILS
Patient scheduled for med check 6/20/22 at 10:00.  
Pt was last seen 1/2021- PPI will be refill.    PSR please set up OV med follow up.   
Report to [place]

## 2022-06-20 NOTE — DISCHARGE NOTE PROVIDER - HOSPITAL COURSE
GINGER NORIEGA is a  84 year old female, on coumadin for atrial fibrillation, lives in assisted living, ambulate with walker/cane presents after a ground level fall today at 6 am. She has been having diarrhea as she was started on antibiotics for her UTI. She did not lose consciousness. She states she felt dizzy and fell but recalls the whole event. She did not hit her head.  She c/o R hip pain and inability to ambulate after the mechanical fall this morning.  She has [employer] pacemaker. Denies HS/LOC. Denies numbness/tingling. Denies fever/chills. Denies pain/injury elsewhere.         Imaging showing R 12th rib fracture and R superior pubic rami fracture, with adjacent pelvic hematoma (with no evidence of active extravasation on CTA).    Initially admitted to floor but found to have persistently elevated INR with acute drop in H/H from 13.1/42.1 to 10.6/32.6. SICU consulted for hemorrhage watch and INR reversal.         Ortho was consulted for right sacral ala and pubic rami fractures. They recommended WBAT repeat Pelvic XR's in 1 week, Ca/Vit D and Outpt osteoporosis workup. UA Negative. Once stable she was transferred to Floor.  HCT remained stable. Physical Therapy recommend [facility].  PPM was interogatted.  At the time of discharge, the patient was hemodynamically stable, tolerating PO diet, voiding urine, ambulating and was comfortable with adequate pain control. The patient was instructed to follow up with Dr. Arredondo within 1-2 weeks after discharge. The patient felt comfortable with discharge. The patient was discharged to rehab. The patient had no other issues. GINGER NORIEGA is a  84 year old female, on coumadin for atrial fibrillation, lives in assisted living, ambulate with walker/cane presents after a ground level fall today at 6 am. She has been having diarrhea as she was started on antibiotics for her UTI. She did not lose consciousness. She states she felt dizzy and fell but recalls the whole event. She did not hit her head.  She c/o R hip pain and inability to ambulate after the mechanical fall this morning.  She has [employer] pacemaker. Denies HS/LOC. Denies numbness/tingling. Denies fever/chills. Denies pain/injury elsewhere.         Imaging showing R 12th rib fracture and R superior pubic rami fracture, with adjacent pelvic hematoma (with no evidence of active extravasation on CTA).    Initially admitted to floor but found to have persistently elevated INR with acute drop in H/H from 13.1/42.1 to 10.6/32.6. SICU consulted for hemorrhage watch and INR reversal.         Ortho was consulted for right sacral ala and pubic rami fractures. They recommended WBAT repeat Pelvic XR's in 1 week, Ca/Vit D and Outpt osteoporosis workup. UA Negative. Once stable she was transferred to Floor.  HCT remained stable. Physical Therapy recommend [facility].  PPM was interogatted.  At the time of discharge, the patient was hemodynamically stable, tolerating PO diet, voiding urine, ambulating and was comfortable with adequate pain control. The patient was instructed to follow up with Dr. Arredondo within 1-2 weeks after discharge. The patient felt comfortable with discharge. The patient was discharged to rehab. The patient had no other issues.        Coumadin resumed and dosed daily Topical Sulfur Applications Counseling: Topical Sulfur Counseling: Patient counseled that this medication may cause skin irritation or allergic reactions.  In the event of skin irritation, the patient was advised to reduce the amount of the drug applied or use it less frequently.   The patient verbalized understanding of the proper use and possible adverse effects of topical sulfur application.  All of the patient's questions and concerns were addressed.

## 2025-03-10 NOTE — PHYSICAL THERAPY INITIAL EVALUATION ADULT - PATIENT/FAMILY/SIGNIFICANT OTHER GOALS STATEMENT, PT EVAL
March 10, 2025     Patient: Iam Kirby  YOB: 1970  Date of Visit: 3/10/2025      To Whom it May Concern:    Iam Kirby is under my professional care. Iam was seen in my office on 3/10/2025. Iam may return to work on 3/12/25 .    If you have any questions or concerns, please don't hesitate to call.         Sincerely,          Kirstie Sevilla PA-C        CC: No Recipients   get better